# Patient Record
Sex: MALE | Race: WHITE | NOT HISPANIC OR LATINO | Employment: OTHER | URBAN - METROPOLITAN AREA
[De-identification: names, ages, dates, MRNs, and addresses within clinical notes are randomized per-mention and may not be internally consistent; named-entity substitution may affect disease eponyms.]

---

## 2022-06-11 ENCOUNTER — APPOINTMENT (OUTPATIENT)
Dept: GENERAL RADIOLOGY | Facility: HOSPITAL | Age: 57
End: 2022-06-11

## 2022-06-11 ENCOUNTER — APPOINTMENT (OUTPATIENT)
Dept: CT IMAGING | Facility: HOSPITAL | Age: 57
End: 2022-06-11

## 2022-06-11 ENCOUNTER — HOSPITAL ENCOUNTER (EMERGENCY)
Facility: HOSPITAL | Age: 57
Discharge: HOME | End: 2022-06-12
Attending: EMERGENCY MEDICINE | Admitting: EMERGENCY MEDICINE

## 2022-06-11 DIAGNOSIS — R07.81 RIB PAIN ON LEFT SIDE: Primary | ICD-10-CM
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
## 2022-06-11 DIAGNOSIS — S22.079A CLOSED FRACTURE OF TENTH THORACIC VERTEBRA, UNSPECIFIED FRACTURE MORPHOLOGY, INITIAL ENCOUNTER: ICD-10-CM

## 2022-06-11 DIAGNOSIS — S22.079A CLOSED FRACTURE OF NINTH THORACIC VERTEBRA, UNSPECIFIED FRACTURE MORPHOLOGY, INITIAL ENCOUNTER: ICD-10-CM

## 2022-06-11 DIAGNOSIS — S22.32XA CLOSED FRACTURE OF ONE RIB OF LEFT SIDE, INITIAL ENCOUNTER: ICD-10-CM

## 2022-06-11 LAB
ALBUMIN SERPL-MCNC: 3.8 G/DL (ref 3.5–5.2)
ALBUMIN/GLOB SERPL: 1.4 G/DL
ALP SERPL-CCNC: 110 U/L (ref 39–117)
ALT SERPL W P-5'-P-CCNC: 11 U/L (ref 1–41)
ANION GAP SERPL CALCULATED.3IONS-SCNC: 11 MMOL/L (ref 5–15)
AST SERPL-CCNC: 15 U/L (ref 1–40)
BASOPHILS # BLD AUTO: 0.1 10*3/MM3 (ref 0–0.2)
BASOPHILS NFR BLD AUTO: 0.8 % (ref 0–1.5)
BILIRUB SERPL-MCNC: 0.2 MG/DL (ref 0–1.2)
BUN SERPL-MCNC: 12 MG/DL (ref 6–20)
BUN/CREAT SERPL: 12.6 (ref 7–25)
CALCIUM SPEC-SCNC: 9.3 MG/DL (ref 8.6–10.5)
CHLORIDE SERPL-SCNC: 98 MMOL/L (ref 98–107)
CO2 SERPL-SCNC: 32 MMOL/L (ref 22–29)
CREAT SERPL-MCNC: 0.95 MG/DL (ref 0.76–1.27)
DEPRECATED RDW RBC AUTO: 42.4 FL (ref 37–54)
EGFRCR SERPLBLD CKD-EPI 2021: 92.8 ML/MIN/1.73
EOSINOPHIL # BLD AUTO: 0.3 10*3/MM3 (ref 0–0.4)
EOSINOPHIL NFR BLD AUTO: 3.4 % (ref 0.3–6.2)
ERYTHROCYTE [DISTWIDTH] IN BLOOD BY AUTOMATED COUNT: 13.9 % (ref 12.3–15.4)
GLOBULIN UR ELPH-MCNC: 2.7 GM/DL
GLUCOSE SERPL-MCNC: 114 MG/DL (ref 65–99)
HCT VFR BLD AUTO: 42.4 % (ref 37.5–51)
HGB BLD-MCNC: 14 G/DL (ref 13–17.7)
LYMPHOCYTES # BLD AUTO: 1.7 10*3/MM3 (ref 0.7–3.1)
LYMPHOCYTES NFR BLD AUTO: 21.1 % (ref 19.6–45.3)
MCH RBC QN AUTO: 28.4 PG (ref 26.6–33)
MCHC RBC AUTO-ENTMCNC: 33.1 G/DL (ref 31.5–35.7)
MCV RBC AUTO: 85.9 FL (ref 79–97)
MONOCYTES # BLD AUTO: 0.7 10*3/MM3 (ref 0.1–0.9)
MONOCYTES NFR BLD AUTO: 9 % (ref 5–12)
NEUTROPHILS NFR BLD AUTO: 5.4 10*3/MM3 (ref 1.7–7)
NEUTROPHILS NFR BLD AUTO: 65.7 % (ref 42.7–76)
NRBC BLD AUTO-RTO: 0 /100 WBC (ref 0–0.2)
NT-PROBNP SERPL-MCNC: 76 PG/ML (ref 0–900)
PLATELET # BLD AUTO: 331 10*3/MM3 (ref 140–450)
PMV BLD AUTO: 6.6 FL (ref 6–12)
POTASSIUM SERPL-SCNC: 3.9 MMOL/L (ref 3.5–5.2)
PROCALCITONIN SERPL-MCNC: 0.08 NG/ML (ref 0–0.25)
PROT SERPL-MCNC: 6.5 G/DL (ref 6–8.5)
RBC # BLD AUTO: 4.94 10*6/MM3 (ref 4.14–5.8)
SODIUM SERPL-SCNC: 141 MMOL/L (ref 136–145)
TROPONIN T SERPL-MCNC: <0.01 NG/ML (ref 0–0.03)
WBC NRBC COR # BLD: 8.2 10*3/MM3 (ref 3.4–10.8)

## 2022-06-11 PROCEDURE — 84484 ASSAY OF TROPONIN QUANT: CPT | Performed by: PHYSICIAN ASSISTANT

## 2022-06-11 PROCEDURE — 99283 EMERGENCY DEPT VISIT LOW MDM: CPT

## 2022-06-11 PROCEDURE — 99284 EMERGENCY DEPT VISIT MOD MDM: CPT

## 2022-06-11 PROCEDURE — 71045 X-RAY EXAM CHEST 1 VIEW: CPT

## 2022-06-11 PROCEDURE — 84145 PROCALCITONIN (PCT): CPT | Performed by: PHYSICIAN ASSISTANT

## 2022-06-11 PROCEDURE — 80053 COMPREHEN METABOLIC PANEL: CPT | Performed by: PHYSICIAN ASSISTANT

## 2022-06-11 PROCEDURE — 0 IOPAMIDOL PER 1 ML: Performed by: EMERGENCY MEDICINE

## 2022-06-11 PROCEDURE — 74177 CT ABD & PELVIS W/CONTRAST: CPT

## 2022-06-11 PROCEDURE — 85025 COMPLETE CBC W/AUTO DIFF WBC: CPT | Performed by: PHYSICIAN ASSISTANT

## 2022-06-11 PROCEDURE — 83880 ASSAY OF NATRIURETIC PEPTIDE: CPT | Performed by: PHYSICIAN ASSISTANT

## 2022-06-11 PROCEDURE — 93005 ELECTROCARDIOGRAM TRACING: CPT | Performed by: PHYSICIAN ASSISTANT

## 2022-06-11 PROCEDURE — 71260 CT THORAX DX C+: CPT

## 2022-06-11 RX ORDER — SODIUM CHLORIDE 0.9 % (FLUSH) 0.9 %
10 SYRINGE (ML) INJECTION AS NEEDED
Status: DISCONTINUED | OUTPATIENT
Start: 2022-06-11 | End: 2022-06-12 | Stop reason: HOSPADM

## 2022-06-11 RX ORDER — LIDOCAINE 50 MG/G
2 PATCH TOPICAL
Status: DISCONTINUED | OUTPATIENT
Start: 2022-06-11 | End: 2022-06-12 | Stop reason: HOSPADM

## 2022-06-11 RX ADMIN — LIDOCAINE 2 PATCH: 50 PATCH TOPICAL at 21:29

## 2022-06-11 RX ADMIN — IOPAMIDOL 100 ML: 755 INJECTION, SOLUTION INTRAVENOUS at 22:17

## 2022-06-12 VITALS
HEIGHT: 71 IN | DIASTOLIC BLOOD PRESSURE: 95 MMHG | HEART RATE: 105 BPM | OXYGEN SATURATION: 95 % | TEMPERATURE: 97.9 F | WEIGHT: 141.54 LBS | RESPIRATION RATE: 15 BRPM | SYSTOLIC BLOOD PRESSURE: 161 MMHG | BODY MASS INDEX: 19.81 KG/M2

## 2022-06-12 PROCEDURE — 63710000001 PREDNISONE PER 5 MG: Performed by: PHYSICIAN ASSISTANT

## 2022-06-12 PROCEDURE — 94640 AIRWAY INHALATION TREATMENT: CPT

## 2022-06-12 RX ORDER — METHYLPREDNISOLONE 4 MG/1
TABLET ORAL
Qty: 21 EACH | Refills: 0 | Status: SHIPPED | OUTPATIENT
Start: 2022-06-12

## 2022-06-12 RX ORDER — HYDROCODONE BITARTRATE AND ACETAMINOPHEN 5; 325 MG/1; MG/1
1 TABLET ORAL ONCE
Status: COMPLETED | OUTPATIENT
Start: 2022-06-12 | End: 2022-06-12

## 2022-06-12 RX ORDER — PREDNISONE 50 MG/1
50 TABLET ORAL ONCE
Status: COMPLETED | OUTPATIENT
Start: 2022-06-12 | End: 2022-06-12

## 2022-06-12 RX ORDER — LIDOCAINE 50 MG/G
1 PATCH TOPICAL EVERY 24 HOURS
Qty: 30 EACH | Refills: 0 | Status: SHIPPED | OUTPATIENT
Start: 2022-06-12

## 2022-06-12 RX ORDER — HYDROCODONE BITARTRATE AND ACETAMINOPHEN 5; 325 MG/1; MG/1
1 TABLET ORAL EVERY 8 HOURS PRN
Qty: 9 TABLET | Refills: 0 | Status: SHIPPED | OUTPATIENT
Start: 2022-06-12

## 2022-06-12 RX ORDER — ALBUTEROL SULFATE 90 UG/1
2 AEROSOL, METERED RESPIRATORY (INHALATION) ONCE
Status: COMPLETED | OUTPATIENT
Start: 2022-06-12 | End: 2022-06-12

## 2022-06-12 RX ORDER — ALBUTEROL SULFATE 90 UG/1
2 AEROSOL, METERED RESPIRATORY (INHALATION) EVERY 6 HOURS PRN
Qty: 6.7 G | Refills: 0 | Status: SHIPPED | OUTPATIENT
Start: 2022-06-12

## 2022-06-12 RX ADMIN — ALBUTEROL SULFATE 2 PUFF: 108 INHALANT RESPIRATORY (INHALATION) at 00:49

## 2022-06-12 RX ADMIN — HYDROCODONE BITARTRATE AND ACETAMINOPHEN 1 TABLET: 5; 325 TABLET ORAL at 00:27

## 2022-06-12 RX ADMIN — PREDNISONE 50 MG: 50 TABLET ORAL at 00:27
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
## 2022-06-12 NOTE — DISCHARGE INSTRUCTIONS
Please take pain medicine as prescribed as needed.  Please use lidocaine patches as needed for local relief.  If these are too expensive via pharmacy can use over-the-counter lidocaine patches instead.  Please use albuterol as needed for shortness of breath or wheezing.  Please take Medrol Dosepak to completion.  Please follow-up with your primary care provider in 1 week's time for symptom follow-up.  Please come back to the ER if you are acting confused, having worsening shortness of breath or spike high fevers you will need reevaluation that time.

## 2022-06-13 LAB — QT INTERVAL: 356 MS

## 2022-06-22 ENCOUNTER — HOSPITAL ENCOUNTER (OUTPATIENT)
Dept: GENERAL RADIOLOGY | Facility: HOSPITAL | Age: 57
Discharge: HOME | End: 2022-06-22

## 2022-06-22 ENCOUNTER — TRANSCRIBE ORDERS (OUTPATIENT)
Dept: ADMINISTRATIVE | Facility: HOSPITAL | Age: 57
End: 2022-06-22

## 2022-06-22 DIAGNOSIS — R07.9 CHEST PAIN, UNSPECIFIED TYPE: ICD-10-CM

## 2022-06-22 DIAGNOSIS — R07.9 CHEST PAIN, UNSPECIFIED TYPE: Primary | ICD-10-CM

## 2022-06-22 PROCEDURE — 71046 X-RAY EXAM CHEST 2 VIEWS: CPT

## 2022-06-22 PROCEDURE — 71100 X-RAY EXAM RIBS UNI 2 VIEWS: CPT

## 2024-01-09 ENCOUNTER — TRANSCRIBE ORDERS (OUTPATIENT)
Dept: ADMINISTRATIVE | Facility: HOSPITAL | Age: 59
End: 2024-01-09
Payer: COMMERCIAL

## 2024-01-09 DIAGNOSIS — R51.9 NONINTRACTABLE HEADACHE, UNSPECIFIED CHRONICITY PATTERN, UNSPECIFIED HEADACHE TYPE: Primary | ICD-10-CM

## 2024-02-06 ENCOUNTER — HOSPITAL ENCOUNTER (OUTPATIENT)
Dept: CT IMAGING | Facility: HOSPITAL | Age: 59
Discharge: HOME | End: 2024-02-06
Admitting: FAMILY MEDICINE
Payer: COMMERCIAL

## 2024-02-06 DIAGNOSIS — R51.9 NONINTRACTABLE HEADACHE, UNSPECIFIED CHRONICITY PATTERN, UNSPECIFIED HEADACHE TYPE: ICD-10-CM

## 2024-02-06 LAB
CREAT BLDA-MCNC: 1.2 MG/DL (ref 0.6–1.3)
EGFRCR SERPLBLD CKD-EPI 2021: 69.2 ML/MIN/1.73

## 2024-02-06 PROCEDURE — 82565 ASSAY OF CREATININE: CPT

## 2024-02-06 PROCEDURE — 25510000001 IOPAMIDOL PER 1 ML: Performed by: FAMILY MEDICINE

## 2024-02-06 PROCEDURE — 70470 CT HEAD/BRAIN W/O & W/DYE: CPT

## 2024-02-06 RX ADMIN — IOPAMIDOL 50 ML: 755 INJECTION, SOLUTION INTRAVENOUS at 12:24

## 2024-05-13 NOTE — PROGRESS NOTES
Primary Care Provider  Edward Ritter MD   Referring Provider  Caleb Ritter MD      Patient Complaint  Establish Care (New Patient ), COPD, and Shortness of Breath      Subjective          Alberto Schultz presents to [facility] PULMONARY & CRITICAL CARE MEDICINE      History of Presenting Illness  Alberto Schultz is a 58 y.o. male with chronic hypoxic respiratory failure, likely severe COPD, severe emphysema, chronic dyspnea, allergies, and tobacco use ongoing, here to establish care.    Mr. Schultz presents as a new patient in our clinic, referred by his primary care provider for management of COPD, chronic dyspnea.  Patient reports that he did go to a lung specialist years ago, at that time pulmonary function testing was done and he was told by 2 different pulmonologist that he only had a few years to live.  He stopped seeing them after this diagnosis, unsure which providers he went to so unable to get PFT results.  Since his diagnosis, patient's breathing has steadily worsened and he is now to the point where he gets short of breath with most any activity.  He would like to be able to improve his quality of life if possible, wants to be able to do normal activities such as mowing his grass without getting extremely winded.  Patient denies using any antibiotics or steroids for his lungs recently, denies any fevers or chills, no recent hospitalizations or ER visits for his breathing.  He continues to use Trelegy 100 daily as well as his albuterol inhaler as needed.  Patient reports that these medications do help his breathing but he still has severe exertional dyspnea.  He has had oxygen at home for about a year he believes, unsure of his DME company or how many liters he is on.  Patient is a current smoker, half a pack per day, which is much less than his previous 3 packs a day.  He is not interested in medications to help him quit, will continue trying to cut back on his own.  Patient denies any  productive cough, hemoptysis, swollen lymph nodes, weight loss, or night sweats.  He does not have a family history of lung cancer but his father had COPD and emphysema.  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  He does not have any pets in the home.  Patient is able to perform ADLs with modifications due to dyspnea.  I have personally reviewed the review of systems, past family, social, medical and surgical histories; and agree with their findings.      Review of Systems    Review of Systems   Constitutional:  Negative for activity change, chills, fatigue, fever, unexpected weight gain and unexpected weight loss.   HENT:  Negative for congestion, ear discharge, ear pain, mouth sores, postnasal drip, rhinorrhea, sinus pressure, sore throat, swollen glands and trouble swallowing.    Eyes:  Negative for blurred vision, pain, discharge, itching and visual disturbance.   Respiratory:  Positive for shortness of breath (with activity). Negative for apnea, cough, chest tightness, wheezing and stridor.    Cardiovascular:  Negative for chest pain, palpitations and leg swelling.   Gastrointestinal:  Negative for abdominal distention, abdominal pain, constipation, diarrhea, nausea, vomiting, GERD and indigestion.   Musculoskeletal:  Negative for arthralgias, joint swelling and myalgias.   Skin:  Negative for color change.   Neurological:  Negative for dizziness, weakness, light-headedness and headache.      Sleep: Negative for Excessive daytime sleepiness  Negative for morning headaches  Negative for Snoring      History reviewed. No pertinent family history.     Social History     Socioeconomic History    Marital status: Single   Tobacco Use    Smoking status: Every Day     Current packs/day: 0.50     Types: Cigarettes    Smokeless tobacco: Never   Vaping Use    Vaping status: Never Used   Substance and Sexual Activity    Alcohol use: Never    Drug use: Never     Sexual activity: Defer        History reviewed. No pertinent past medical history.     Immunization History   Administered Date(s) Administered    31-influenza Vac Quardvalent Preservativ 10/21/2014    Pneumococcal Conjugate 20-Valent (PCV20) 05/14/2024       No Known Allergies       Current Outpatient Medications:     albuterol sulfate  (90 Base) MCG/ACT inhaler, Inhale 2 puffs Every 6 (Six) Hours As Needed for Wheezing or Shortness of Air., Disp: 6.7 g, Rfl: 0    busPIRone (BUSPAR) 5 MG tablet, Take 1 tablet by mouth 3 (Three) Times a Day As Needed. for anxiety, Disp: , Rfl:     neomycin-polymyxin-hydrocortisone (CORTISPORIN) 1 % solution otic solution, INSTILL 2 DROPS INTO AFFECTED EAR(S) FOUR TIMES DAILY, Disp: , Rfl:     cetirizine (zyrTEC) 10 MG tablet, Take 1 tablet by mouth Daily., Disp: 30 tablet, Rfl: 5    Fluticasone-Umeclidin-Vilant (TRELEGY ELLIPTA) 200-62.5-25 MCG/ACT inhaler, Inhale 1 puff Daily., Disp: 1 each, Rfl: 5    Fluticasone-Umeclidin-Vilant (Trelegy Ellipta) 200-62.5-25 MCG/ACT inhaler, Inhale 1 puff Daily for 1 day., Disp: 2 each, Rfl: 0    HYDROcodone-acetaminophen (NORCO) 5-325 MG per tablet, Take 1 tablet by mouth Every 8 (Eight) Hours As Needed for Severe Pain . (Patient not taking: Reported on 5/14/2024), Disp: 9 tablet, Rfl: 0    ipratropium-albuterol (DUO-NEB) 0.5-2.5 mg/3 ml nebulizer, Take 3 mL by nebulization 4 (Four) Times a Day., Disp: 360 mL, Rfl: 3    lidocaine (LIDODERM) 5 %, Place 1 patch on the skin as directed by provider Daily. Remove & Discard patch within 12 hours or as directed by MD (Patient not taking: Reported on 5/14/2024), Disp: 30 each, Rfl: 0    predniSONE (DELTASONE) 10 MG tablet, Take 4 tabs daily x 3 days, then take 3 tabs daily x 3 days, then take 2 tabs daily x 3 days, then take 1 tab daily x 3 days, Disp: 31 tablet, Rfl: 0     Objective     Vital Signs:   /81 (BP Location: Left arm, Patient Position: Sitting, Cuff Size: Adult)   Pulse 99    "Temp 98 °F (36.7 °C) (Oral)   Resp 18   Ht 180.3 cm (71\")   Wt 63.8 kg (140 lb 11.2 oz)   SpO2 90% Comment: ROOM AIR  BMI 19.62 kg/m²     Physical Exam  Constitutional:       General: He is not in acute distress.     Appearance: Normal appearance. He is normal weight. He is not ill-appearing.   HENT:      Right Ear: Tympanic membrane and ear canal normal.      Left Ear: Tympanic membrane and ear canal normal.      Nose: Nose normal.      Mouth/Throat:      Mouth: Mucous membranes are moist.      Pharynx: Oropharynx is clear.   Eyes:      Extraocular Movements: Extraocular movements intact.      Conjunctiva/sclera: Conjunctivae normal.      Pupils: Pupils are equal, round, and reactive to light.   Cardiovascular:      Rate and Rhythm: Normal rate and regular rhythm.      Pulses: Normal pulses.      Heart sounds: Normal heart sounds.   Pulmonary:      Effort: Pulmonary effort is normal. No respiratory distress.      Breath sounds: Decreased air movement present. No stridor. No wheezing, rhonchi or rales.      Comments: Extremely diminished, tight throughout  Abdominal:      General: Bowel sounds are normal.      Palpations: Abdomen is soft.   Musculoskeletal:         General: No swelling. Normal range of motion.      Cervical back: Normal range of motion and neck supple.      Right lower leg: No edema.      Left lower leg: No edema.   Skin:     General: Skin is warm and dry.   Neurological:      General: No focal deficit present.      Mental Status: He is alert and oriented to person, place, and time.      Motor: No weakness.   Psychiatric:         Mood and Affect: Mood normal.         Behavior: Behavior normal.        Result Review :   I have personally reviewed patient's labs and images.  I also reviewed Dr. Ritter PCP's last progress note 4/11/2024.            Diagnoses and all orders for this visit:    1. Chronic respiratory failure with hypoxia (Primary)  -     Oxygen Therapy    2. Chronic obstructive " pulmonary disease, unspecified COPD type  -     6 Minute Walk Test; Future  -     ipratropium-albuterol (DUO-NEB) 0.5-2.5 mg/3 ml nebulizer; Take 3 mL by nebulization 4 (Four) Times a Day.  Dispense: 360 mL; Refill: 3  -     Home Nebulizer  -     Fluticasone-Umeclidin-Vilant (TRELEGY ELLIPTA) 200-62.5-25 MCG/ACT inhaler; Inhale 1 puff Daily.  Dispense: 1 each; Refill: 5  -     predniSONE (DELTASONE) 10 MG tablet; Take 4 tabs daily x 3 days, then take 3 tabs daily x 3 days, then take 2 tabs daily x 3 days, then take 1 tab daily x 3 days  Dispense: 31 tablet; Refill: 0    3. Pulmonary emphysema, unspecified emphysema type  -     6 Minute Walk Test; Future  -     ipratropium-albuterol (DUO-NEB) 0.5-2.5 mg/3 ml nebulizer; Take 3 mL by nebulization 4 (Four) Times a Day.  Dispense: 360 mL; Refill: 3  -     Home Nebulizer  -     Fluticasone-Umeclidin-Vilant (TRELEGY ELLIPTA) 200-62.5-25 MCG/ACT inhaler; Inhale 1 puff Daily.  Dispense: 1 each; Refill: 5  -     predniSONE (DELTASONE) 10 MG tablet; Take 4 tabs daily x 3 days, then take 3 tabs daily x 3 days, then take 2 tabs daily x 3 days, then take 1 tab daily x 3 days  Dispense: 31 tablet; Refill: 0    4. Dyspnea, unspecified type  -     6 Minute Walk Test; Future  -     ipratropium-albuterol (DUO-NEB) 0.5-2.5 mg/3 ml nebulizer; Take 3 mL by nebulization 4 (Four) Times a Day.  Dispense: 360 mL; Refill: 3  -     Home Nebulizer  -     Fluticasone-Umeclidin-Vilant (TRELEGY ELLIPTA) 200-62.5-25 MCG/ACT inhaler; Inhale 1 puff Daily.  Dispense: 1 each; Refill: 5  -     predniSONE (DELTASONE) 10 MG tablet; Take 4 tabs daily x 3 days, then take 3 tabs daily x 3 days, then take 2 tabs daily x 3 days, then take 1 tab daily x 3 days  Dispense: 31 tablet; Refill: 0    5. Seasonal allergies  -     cetirizine (zyrTEC) 10 MG tablet; Take 1 tablet by mouth Daily.  Dispense: 30 tablet; Refill: 5    6. Tobacco abuse    7. Encounter for smoking cessation counseling    8. Encounter for  "immunization  -     Pneumococcal Conjugate Vaccine 20-Valent (PCV20)       Impression and Plan    -CT chest 6/11/2022 after a fall showed rib fractures, severe emphysema, subtle pleural plaque in the right upper lobe, \"bronchial wall thickening due to bronchiolitis or smoking-related lung disease\"  -Pulmonary function testing done in the past per patient, unsure when or where.  Will plan to order updated PFTs next visit.  -Patient needs to be enrolled in annual low-dose lung cancer screening [place] program, will order next visit  -6-minute walk test done in clinic today 5/14/2024, patient required 4 L continuous supplemental oxygen to maintain O2 saturation.  New oxygen order placed.  -Increase Trelegy dose from 100 to 200 daily, reminded patient to rinse mouth after each use.  Samples of higher dose Trelegy provided in clinic today, prescription sent to patient's pharmacy.  -Continue using albuterol inhaler as needed  -Begin using DuoNeb treatments every 6 hours as needed, new nebulizer machine provided in clinic today.  Encouraged patient to use at least morning and night.  -Prednisone prescribed today for patient to have on hand in case of COPD exacerbation  -Begin taking Zyrtec daily for seasonal allergies  -Smoking cessation counseling provided.  I spent 5 minutes today counseling patient on the risks of smoking, including throat cancer, lung cancer, COPD, heart disease and death.  Also discussed the benefits of quitting.  -Follow-up with myself in 6 weeks    Smoking status: Reviewed  Vaccination status: Patient reports he is up-to-date declines COVID-vaccine, will be due for flu vaccine in the fall, PCV 20 administered in clinic today.  He is interested in the RSV vaccine, instructed him to reach out to insurance to see where they prefer he get it.  Patient is advised to continue to follow CDC recommendations such as social distancing wearing a mask and washing hands for at least 20 seconds.  Medications " personally reviewed    Follow Up   No follow-ups on file.  Patient was given instructions and counseling regarding his condition or for health maintenance advice. Please see specific information pulled into the AVS if appropriate.

## 2024-05-14 ENCOUNTER — OFFICE VISIT (OUTPATIENT)
Dept: PULMONOLOGY | Facility: CLINIC | Age: 59
End: 2024-05-14
Payer: COMMERCIAL

## 2024-05-14 VITALS
WEIGHT: 140.7 LBS | OXYGEN SATURATION: 90 % | HEIGHT: 71 IN | RESPIRATION RATE: 18 BRPM | SYSTOLIC BLOOD PRESSURE: 131 MMHG | DIASTOLIC BLOOD PRESSURE: 81 MMHG | TEMPERATURE: 98 F | BODY MASS INDEX: 19.7 KG/M2 | HEART RATE: 99 BPM

## 2024-05-14 DIAGNOSIS — Z72.0 TOBACCO ABUSE: ICD-10-CM

## 2024-05-14 DIAGNOSIS — J43.9 PULMONARY EMPHYSEMA, UNSPECIFIED EMPHYSEMA TYPE: ICD-10-CM

## 2024-05-14 DIAGNOSIS — J44.9 CHRONIC OBSTRUCTIVE PULMONARY DISEASE, UNSPECIFIED COPD TYPE: ICD-10-CM

## 2024-05-14 DIAGNOSIS — R06.00 DYSPNEA, UNSPECIFIED TYPE: ICD-10-CM

## 2024-05-14 DIAGNOSIS — Z23 ENCOUNTER FOR IMMUNIZATION: ICD-10-CM

## 2024-05-14 DIAGNOSIS — J96.11 CHRONIC RESPIRATORY FAILURE WITH HYPOXIA: Primary | ICD-10-CM

## 2024-05-14 DIAGNOSIS — J30.2 SEASONAL ALLERGIES: ICD-10-CM

## 2024-05-14 DIAGNOSIS — Z71.6 ENCOUNTER FOR SMOKING CESSATION COUNSELING: ICD-10-CM

## 2024-05-14 PROCEDURE — 1160F RVW MEDS BY RX/DR IN RCRD: CPT

## 2024-05-14 PROCEDURE — 90677 PCV20 VACCINE IM: CPT

## 2024-05-14 PROCEDURE — 94618 PULMONARY STRESS TESTING: CPT

## 2024-05-14 PROCEDURE — 90471 IMMUNIZATION ADMIN: CPT

## 2024-05-14 PROCEDURE — 1159F MED LIST DOCD IN RCRD: CPT

## 2024-05-14 PROCEDURE — 99244 OFF/OP CNSLTJ NEW/EST MOD 40: CPT

## 2024-05-14 RX ORDER — NEOMYCIN SULFATE, POLYMYXIN B SULFATE, HYDROCORTISONE 3.5; 10000; 1 MG/ML; [USP'U]/ML; MG/ML
SOLUTION/ DROPS AURICULAR (OTIC)
COMMUNITY
Start: 2024-04-11

## 2024-05-14 RX ORDER — BUSPIRONE HYDROCHLORIDE 5 MG/1
5 TABLET ORAL 3 TIMES DAILY PRN
COMMUNITY
Start: 2024-04-11

## 2024-05-14 RX ORDER — FLUTICASONE FUROATE, UMECLIDINIUM BROMIDE AND VILANTEROL TRIFENATATE 200; 62.5; 25 UG/1; UG/1; UG/1
1 POWDER RESPIRATORY (INHALATION) DAILY
Qty: 2 EACH | Refills: 0 | COMMUNITY
Start: 2024-05-14 | End: 2024-05-15

## 2024-05-14 RX ORDER — IPRATROPIUM BROMIDE AND ALBUTEROL SULFATE 2.5; .5 MG/3ML; MG/3ML
3 SOLUTION RESPIRATORY (INHALATION)
Qty: 360 ML | Refills: 3 | Status: SHIPPED | OUTPATIENT
Start: 2024-05-14

## 2024-05-14 RX ORDER — CETIRIZINE HYDROCHLORIDE 10 MG/1
10 TABLET ORAL DAILY
Qty: 30 TABLET | Refills: 5 | Status: SHIPPED | OUTPATIENT
Start: 2024-05-14

## 2024-05-14 RX ORDER — PREDNISONE 10 MG/1
TABLET ORAL
Qty: 31 TABLET | Refills: 0 | Status: SHIPPED | OUTPATIENT
Start: 2024-05-14

## 2024-05-14 RX ORDER — AZITHROMYCIN 250 MG/1
TABLET, FILM COATED ORAL
COMMUNITY
Start: 2024-04-11 | End: 2024-05-14

## 2024-05-15 ENCOUNTER — TELEPHONE (OUTPATIENT)
Dept: PULMONOLOGY | Facility: CLINIC | Age: 59
End: 2024-05-15
Payer: COMMERCIAL

## 2024-05-15 NOTE — TELEPHONE ENCOUNTER
[facility]. Per insurance no PA is required for Stiolto. Please Advise, Thank you

## 2024-05-16 RX ORDER — TIOTROPIUM BROMIDE AND OLODATEROL 3.124; 2.736 UG/1; UG/1
2 SPRAY, METERED RESPIRATORY (INHALATION)
Qty: 1 EACH | Refills: 3 | Status: SHIPPED | OUTPATIENT
Start: 2024-05-16 | End: 2024-05-17

## 2024-05-16 NOTE — TELEPHONE ENCOUNTER
Called and spoke with Anna on verbal release patients mother. Informed her Trelegy was not covered so Stiolto was sent in instead. Informed her patient can finish the samples of trelegy we gave him and then start the Stiolto. Instructed her that patient should not to take both as it will be duplicate therapy. Anna understood and stated she would inform the patient.

## 2024-06-22 NOTE — PROGRESS NOTES
Primary Care Provider  Edward Ritter MD   Referring Provider  No ref. provider found      Patient Complaint  Chronic respiratory failure with hypoxia and Follow-up (6 Week )      Subjective          Alberto Schultz presents to [facility] PULMONARY & CRITICAL CARE MEDICINE      History of Presenting Illness  Alberto Schultz is a 58 y.o. male with chronic hypoxic respiratory failure, likely severe COPD, severe emphysema, chronic dyspnea, allergies, and tobacco use ongoing, here for 6 week follow up.    Patient states he is willing better since his last visit, previously seen by myself as new patient 6 weeks ago, at which time we adjusted his inhaler/nebulizer medications and did an updated walk test.  Patient reports that he did go to a lung specialist years ago, at that time pulmonary function testing was done and he was told by 2 different pulmonologists that he only had a few years to live.  He stopped seeing them after this diagnosis, unsure which providers he went to so unable to get PFT results.  Patient's chronic dyspnea has steadily worsened and he is now to the point where he gets short of breath with most any activity.  He would like to be able to improve his quality of life if possible, wants to be able to do normal activities such as mowing his grass without getting extremely winded.  Patient denies using any antibiotics or steroids for his lungs recently, denies any fevers or chills, no hospitalizations or ER visits for his breathing since he was last seen.  He has been using Trelegy 200 daily as well as his albuterol inhaler and DuoNeb treatments as needed since his last visit.  Patient reports that these medications have helped his respiratory symptoms.  He continues to wear 4 L supplemental oxygen.  Patient is a current smoker, half a pack per day, which is much less than his previous 3 packs a day.  He is not interested in medications to help him quit, will continue trying to cut back on  his own.  Patient denies any hemoptysis, swollen lymph nodes, weight loss, or night sweats.  He does not have a family history of lung cancer but his father had COPD and emphysema.  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  He does not have any pets in the home.  Patient is able to perform ADLs with modifications due to dyspnea.  I have personally reviewed the review of systems, past family, social, medical and surgical histories; and agree with their findings.      Review of Systems    Review of Systems   Constitutional:  Negative for activity change, chills, fatigue, fever, unexpected weight gain and unexpected weight loss.   HENT:  Negative for congestion, ear discharge, ear pain, mouth sores, postnasal drip, rhinorrhea, sinus pressure, sore throat, swollen glands and trouble swallowing.    Eyes:  Negative for blurred vision, pain, discharge, itching and visual disturbance.   Respiratory:  Positive for shortness of breath (with activity). Negative for apnea, cough, chest tightness, wheezing and stridor.    Cardiovascular:  Negative for chest pain, palpitations and leg swelling.   Gastrointestinal:  Negative for abdominal distention, abdominal pain, constipation, diarrhea, nausea, vomiting, GERD and indigestion.   Musculoskeletal:  Negative for arthralgias, joint swelling and myalgias.   Skin:  Negative for color change.   Neurological:  Negative for dizziness, weakness, light-headedness and headache.      Sleep: Negative for Excessive daytime sleepiness  Negative for morning headaches  Negative for Snoring      History reviewed. No pertinent family history.     Social History     Socioeconomic History    Marital status: Single   Tobacco Use    Smoking status: Every Day     Current packs/day: 1.50     Average packs/day: 2.8 packs/day for 48.5 years (137.2 ttl pk-yrs)     Types: Cigarettes     Start date: 1976     Passive exposure: Current    Smokeless  "tobacco: Never   Vaping Use    Vaping status: Never Used   Substance and Sexual Activity    Alcohol use: Never    Drug use: Never    Sexual activity: Defer        History reviewed. No pertinent past medical history.     Immunization History   Administered Date(s) Administered    31-influenza Vac Quardvalent Preservativ 10/21/2014    Pneumococcal Conjugate 20-Valent (PCV20) 05/14/2024       No Known Allergies       Current Outpatient Medications:     albuterol sulfate  (90 Base) MCG/ACT inhaler, Inhale 2 puffs Every 6 (Six) Hours As Needed for Wheezing or Shortness of Air., Disp: 6.7 g, Rfl: 0    busPIRone (BUSPAR) 5 MG tablet, Take 1 tablet by mouth 3 (Three) Times a Day As Needed. for anxiety, Disp: , Rfl:     cetirizine (zyrTEC) 10 MG tablet, Take 1 tablet by mouth Daily., Disp: 30 tablet, Rfl: 5    HYDROcodone-acetaminophen (NORCO) 5-325 MG per tablet, Take 1 tablet by mouth Every 8 (Eight) Hours As Needed for Severe Pain ., Disp: 9 tablet, Rfl: 0    ipratropium-albuterol (DUO-NEB) 0.5-2.5 mg/3 ml nebulizer, Take 3 mL by nebulization 4 (Four) Times a Day., Disp: 360 mL, Rfl: 3    lidocaine (LIDODERM) 5 %, Place 1 patch on the skin as directed by provider Daily. Remove & Discard patch within 12 hours or as directed by MD, Disp: 30 each, Rfl: 0    neomycin-polymyxin-hydrocortisone (CORTISPORIN) 1 % solution otic solution, INSTILL 2 DROPS INTO AFFECTED EAR(S) FOUR TIMES DAILY, Disp: , Rfl:     predniSONE (DELTASONE) 10 MG tablet, Take 4 tabs daily x 3 days, then take 3 tabs daily x 3 days, then take 2 tabs daily x 3 days, then take 1 tab daily x 3 days, Disp: 31 tablet, Rfl: 0     Objective     Vital Signs:   /86 (BP Location: Left arm, Patient Position: Sitting, Cuff Size: Adult)   Pulse 100   Temp 97.8 °F (36.6 °C) (Temporal)   Resp 16   Ht 170.2 cm (67\")   Wt 64.4 kg (142 lb)   SpO2 90% Comment: Room air. Uses 4L as needed  BMI 22.24 kg/m²     Physical Exam  Constitutional:       General: He " is not in acute distress.     Appearance: Normal appearance. He is normal weight. He is not ill-appearing.   HENT:      Right Ear: Tympanic membrane and ear canal normal.      Left Ear: Tympanic membrane and ear canal normal.      Nose: Nose normal.      Mouth/Throat:      Mouth: Mucous membranes are moist.      Pharynx: Oropharynx is clear.   Eyes:      Extraocular Movements: Extraocular movements intact.      Conjunctiva/sclera: Conjunctivae normal.      Pupils: Pupils are equal, round, and reactive to light.   Cardiovascular:      Rate and Rhythm: Normal rate and regular rhythm.      Pulses: Normal pulses.      Heart sounds: Normal heart sounds.   Pulmonary:      Effort: Pulmonary effort is normal. No respiratory distress.      Breath sounds: Decreased air movement present. No stridor. No wheezing, rhonchi or rales.      Comments: Extremely diminished, tight throughout  Abdominal:      General: Bowel sounds are normal.      Palpations: Abdomen is soft.   Musculoskeletal:         General: No swelling. Normal range of motion.      Cervical back: Normal range of motion and neck supple.      Right lower leg: No edema.      Left lower leg: No edema.   Skin:     General: Skin is warm and dry.   Neurological:      General: No focal deficit present.      Mental Status: He is alert and oriented to person, place, and time.      Motor: No weakness.   Psychiatric:         Mood and Affect: Mood normal.         Behavior: Behavior normal.        Result Review :   I have personally reviewed patient's labs and images.  I also reviewed my last office note 5/14/2024.       Diagnoses and all orders for this visit:    1. Chronic respiratory failure with hypoxia (Primary)    2. Chronic obstructive pulmonary disease, unspecified COPD type  -     predniSONE (DELTASONE) 10 MG tablet; Take 4 tabs daily x 3 days, then take 3 tabs daily x 3 days, then take 2 tabs daily x 3 days, then take 1 tab daily x 3 days  Dispense: 31 tablet; Refill:  "0    3. Pulmonary emphysema, unspecified emphysema type  -     predniSONE (DELTASONE) 10 MG tablet; Take 4 tabs daily x 3 days, then take 3 tabs daily x 3 days, then take 2 tabs daily x 3 days, then take 1 tab daily x 3 days  Dispense: 31 tablet; Refill: 0    4. Dyspnea, unspecified type  -     predniSONE (DELTASONE) 10 MG tablet; Take 4 tabs daily x 3 days, then take 3 tabs daily x 3 days, then take 2 tabs daily x 3 days, then take 1 tab daily x 3 days  Dispense: 31 tablet; Refill: 0    5. Seasonal allergies    6. Tobacco abuse  -     CT Chest Low Dose Wo; Future    7. Encounter for smoking cessation counseling  -     CT Chest Low Dose Wo; Future    8. Personal history of nicotine dependence  -     CT Chest Low Dose Wo; Future      Impression and Plan    -CT chest 6/11/2022 after a fall showed rib fractures, severe emphysema, subtle pleural plaque in the right upper lobe, \"bronchial wall thickening due to bronchiolitis or smoking-related lung disease\"  -Patient states he recently had PFTs done with Dr. Ritter, will try to obtain records  -Patient qualifies for enrollment in annual lung cancer screening program, will order today  -Continue wearing 4 L continuous supplemental oxygen to maintain O2 saturation.  Patient does not wear all the time, I encouraged him to wear continuously.  -Continue using Trelegy 200 daily, reminded patient to rinse mouth after each use  -Continue using albuterol inhaler and DuoNeb treatments as needed, nebulizer machine provided in clinic last visit.  Encouraged patient to use at least morning and night.  -Patient has prednisone on hand in case of COPD exacerbation  -Continue taking Zyrtec daily for seasonal allergies  -Smoking cessation counseling provided.  I spent 5 minutes today counseling patient on the risks of smoking, including throat cancer, lung cancer, COPD, heart disease and death.  Also discussed the benefits of quitting.  -Follow-up with MD in 2 months after LDCT    Smoking " status: Reviewed  Vaccination status: Patient reports he is up-to-date with his pneumonia vaccine, will be due for flu vaccine in the fall.  He is interested in the RSV vaccine, instructed him to reach out to insurance to see where they prefer he get it.  Patient is advised to continue to follow CDC recommendations such as social distancing wearing a mask and washing hands for at least 20 seconds.  Medications personally reviewed    Follow Up   No follow-ups on file.  Patient was given instructions and counseling regarding his condition or for health maintenance advice. Please see specific information pulled into the AVS if appropriate.

## 2024-06-25 ENCOUNTER — OFFICE VISIT (OUTPATIENT)
Dept: PULMONOLOGY | Facility: CLINIC | Age: 59
End: 2024-06-25
Payer: COMMERCIAL

## 2024-06-25 VITALS
HEART RATE: 100 BPM | HEIGHT: 67 IN | TEMPERATURE: 97.8 F | OXYGEN SATURATION: 90 % | BODY MASS INDEX: 22.29 KG/M2 | WEIGHT: 142 LBS | RESPIRATION RATE: 16 BRPM | DIASTOLIC BLOOD PRESSURE: 86 MMHG | SYSTOLIC BLOOD PRESSURE: 139 MMHG

## 2024-06-25 DIAGNOSIS — Z71.6 ENCOUNTER FOR SMOKING CESSATION COUNSELING: ICD-10-CM

## 2024-06-25 DIAGNOSIS — J30.2 SEASONAL ALLERGIES: ICD-10-CM

## 2024-06-25 DIAGNOSIS — Z72.0 TOBACCO ABUSE: ICD-10-CM

## 2024-06-25 DIAGNOSIS — J44.9 CHRONIC OBSTRUCTIVE PULMONARY DISEASE, UNSPECIFIED COPD TYPE: ICD-10-CM

## 2024-06-25 DIAGNOSIS — J96.11 CHRONIC RESPIRATORY FAILURE WITH HYPOXIA: Primary | ICD-10-CM

## 2024-06-25 DIAGNOSIS — R06.00 DYSPNEA, UNSPECIFIED TYPE: ICD-10-CM

## 2024-06-25 DIAGNOSIS — J43.9 PULMONARY EMPHYSEMA, UNSPECIFIED EMPHYSEMA TYPE: ICD-10-CM

## 2024-06-25 DIAGNOSIS — Z87.891 PERSONAL HISTORY OF NICOTINE DEPENDENCE: ICD-10-CM

## 2024-06-25 PROCEDURE — 1160F RVW MEDS BY RX/DR IN RCRD: CPT

## 2024-06-25 PROCEDURE — 99214 OFFICE O/P EST MOD 30 MIN: CPT

## 2024-06-25 PROCEDURE — 1159F MED LIST DOCD IN RCRD: CPT

## 2024-06-25 RX ORDER — PREDNISONE 10 MG/1
TABLET ORAL
Qty: 31 TABLET | Refills: 0 | Status: SHIPPED | OUTPATIENT
Start: 2024-06-25

## 2025-06-18 ENCOUNTER — OFFICE VISIT (OUTPATIENT)
Dept: SURGERY | Facility: CLINIC | Age: 60
End: 2025-06-18
Payer: COMMERCIAL

## 2025-06-18 ENCOUNTER — PREP FOR SURGERY (OUTPATIENT)
Dept: OTHER | Facility: HOSPITAL | Age: 60
End: 2025-06-18
Payer: COMMERCIAL

## 2025-06-18 VITALS
BODY MASS INDEX: 22.29 KG/M2 | SYSTOLIC BLOOD PRESSURE: 129 MMHG | OXYGEN SATURATION: 94 % | HEART RATE: 88 BPM | DIASTOLIC BLOOD PRESSURE: 80 MMHG | WEIGHT: 142 LBS | HEIGHT: 67 IN

## 2025-06-18 DIAGNOSIS — K62.5 BRBPR (BRIGHT RED BLOOD PER RECTUM): ICD-10-CM

## 2025-06-18 DIAGNOSIS — R11.0 NAUSEA: ICD-10-CM

## 2025-06-18 DIAGNOSIS — K21.9 GASTROESOPHAGEAL REFLUX DISEASE WITHOUT ESOPHAGITIS: ICD-10-CM

## 2025-06-18 DIAGNOSIS — R19.7 DIARRHEA: ICD-10-CM

## 2025-06-18 DIAGNOSIS — R10.13 EPIGASTRIC PAIN: ICD-10-CM

## 2025-06-18 DIAGNOSIS — K21.00 GASTROESOPHAGEAL REFLUX DISEASE WITH ESOPHAGITIS WITHOUT HEMORRHAGE: Primary | ICD-10-CM

## 2025-06-18 DIAGNOSIS — K92.1 MELENA: ICD-10-CM

## 2025-06-18 DIAGNOSIS — Z72.0 TOBACCO USE: Primary | ICD-10-CM

## 2025-06-18 DIAGNOSIS — R19.7 DIARRHEA, UNSPECIFIED TYPE: ICD-10-CM

## 2025-06-18 RX ORDER — POLYETHYLENE GLYCOL 3350 17 G/17G
POWDER, FOR SOLUTION ORAL
Qty: 238 PACKET | Refills: 0 | Status: ON HOLD | OUTPATIENT
Start: 2025-06-18 | End: 2025-06-20

## 2025-06-18 RX ORDER — SODIUM CHLORIDE 9 MG/ML
40 INJECTION, SOLUTION INTRAVENOUS AS NEEDED
Status: CANCELLED | OUTPATIENT
Start: 2025-06-18

## 2025-06-18 RX ORDER — SODIUM CHLORIDE 0.9 % (FLUSH) 0.9 %
10 SYRINGE (ML) INJECTION AS NEEDED
Status: CANCELLED | OUTPATIENT
Start: 2025-06-18

## 2025-06-18 RX ORDER — SODIUM CHLORIDE 0.9 % (FLUSH) 0.9 %
3 SYRINGE (ML) INJECTION EVERY 12 HOURS SCHEDULED
Status: CANCELLED | OUTPATIENT
Start: 2025-06-18

## 2025-06-18 NOTE — PROGRESS NOTES
Chief Complaint: Colonoscopy    Subjective     EGD colonoscopy consultation       History of Present Illness  Alberto Schultz is a 59 y.o. male presents to [facility] for EGD colonoscopy consultation.    Patient's sister is present for this visit    Patient presents today on referral from Dr. Caleb Ritter for EGD colonoscopy consultation.  Patient reports that he does have some intermittent heartburn and indigestion, despite taking OTC meds.  Patient does report that he has nausea no vomiting.  Admits to epigastric pain.  Admits to melena x 1 year.  Admits to pain before eating.  Denies any family history of esophageal or stomach cancer.    Patient does report that he is having some lower abdominal pain associated with diarrhea.  He reports he rarely has a solid stool.  Admits to bright red blood per rectum while wiping.  He also reports it is significant enough he sees it in the toilet.  Denies any family history of colorectal cancer.  No previous colonoscopy.    Patient reports that he has had a decrease in appetite and a 15 pound weight loss over the last 6 months.    Patient reports he had a JUAN LUIS test that was inconclusive.    Denies any cardiac issues.    Denies taking any GLP-receptors.    Patient is on O2 at 4 L for COPD.  He is under the care of Dr. Bran        Objective     History reviewed. No pertinent past medical history.    Past Surgical History:   Procedure Laterality Date    OTHER SURGICAL HISTORY Left     SHOULDER       Outpatient Medications Marked as Taking for the 6/18/25 encounter (Office Visit) with Carleen Ramos APRN   Medication Sig Dispense Refill    albuterol sulfate  (90 Base) MCG/ACT inhaler Inhale 2 puffs Every 6 (Six) Hours As Needed for Wheezing or Shortness of Air. 6.7 g 0    ipratropium-albuterol (DUO-NEB) 0.5-2.5 mg/3 ml nebulizer Take 3 mL by nebulization 4 (Four) Times a Day. 360 mL 3       No Known Allergies     History reviewed. No pertinent  "family history.    Social History     Socioeconomic History    Marital status: Single   Tobacco Use    Smoking status: Every Day     Current packs/day: 1.50     Average packs/day: 2.8 packs/day for 49.5 years (138.7 ttl pk-yrs)     Types: Cigarettes     Start date: 1976     Passive exposure: Current    Smokeless tobacco: Never   Vaping Use    Vaping status: Never Used   Substance and Sexual Activity    Alcohol use: Never    Drug use: Never    Sexual activity: Defer       Review of Systems   Constitutional:  Negative for chills and fever.   HENT:  Negative for trouble swallowing.    Gastrointestinal:  Positive for abdominal pain, anal bleeding, blood in stool, diarrhea, nausea, GERD and indigestion. Negative for abdominal distention, constipation, rectal pain and vomiting.        Vital Signs:   /80 (BP Location: Right arm, Patient Position: Sitting, Cuff Size: Adult)   Pulse 88   Ht 170.2 cm (67\")   Wt 64.4 kg (142 lb)   SpO2 94% Comment: 4liters-o2 continuous  BMI 22.24 kg/m²      Physical Exam  Vitals and nursing note reviewed.   Constitutional:       General: He is not in acute distress.     Appearance: Normal appearance. He is not ill-appearing.   HENT:      Head: Normocephalic and atraumatic.   Cardiovascular:      Rate and Rhythm: Normal rate.   Pulmonary:      Effort: Pulmonary effort is normal.      Breath sounds: No stridor.      Comments: O2 @ 4 L  Abdominal:      Palpations: Abdomen is soft.      Tenderness: There is no guarding.   Musculoskeletal:         General: No deformity. Normal range of motion.   Skin:     General: Skin is warm and dry.      Coloration: Skin is not jaundiced.   Neurological:      General: No focal deficit present.      Mental Status: He is alert and oriented to person, place, and time.   Psychiatric:         Mood and Affect: Mood normal.         Thought Content: Thought content normal.          Result Review :          []  Laboratory  []  Radiology  []  Pathology  []  " Microbiology  []  EKG/Telemetry   []  Cardiology/Vascular   []  Old records  I spent 30 minutes caring for Alberto on this date of service. This time includes time spent by me in the following activities: reviewing tests, obtaining and/or reviewing a separately obtained history, performing a medically appropriate examination and/or evaluation, ordering medications, tests, or procedures, and documenting information in the medical record        Assessment and Plan    Diagnoses and all orders for this visit:    1. Tobacco use (Primary)    2. Gastroesophageal reflux disease without esophagitis    3. Nausea    4. Epigastric pain    5. Melena    6. Diarrhea, unspecified type    7. BRBPR (bright red blood per rectum)    Other orders  -     polyethylene glycol (MIRALAX) 17 g packet; Take as directed.  Instructions given in office.  Dispense: 238 g bottle  Dispense: 238 packet; Refill: 0    Pulmonary clearance from Bran.        Follow Up   Return for Schedule EGD and colonoscopy with Dr. Gamble on 6/20/2025 at [facility].    Hospital arrival time: 0600    Possible risks/complications, benefits, and alternatives to surgical or invasive procedures have been explained to patient and/or legal guardian.    Patient has been evaluated and can tolerate anesthesia and/or sedation. Risks, benefits, and alternatives to anesthesia and sedation have been explained to the patient and/or legal guardian. Patient verbalizes understanding and is willing to proceed with the above plan.     Patient was given instructions and counseling regarding his condition or for health maintenance advice. Please see specific information pulled into the AVS if appropriate.     As always, it has been a pleasure to participate in your patient's care. Please call with questions or concerns.

## 2025-06-18 NOTE — H&P (VIEW-ONLY)
Chief Complaint: Colonoscopy    Subjective     EGD colonoscopy consultation       History of Present Illness  Alberto Schultz is a 59 y.o. male presents to [facility] for EGD colonoscopy consultation.    Patient's sister is present for this visit    Patient presents today on referral from Dr. Caleb Ritter for EGD colonoscopy consultation.  Patient reports that he does have some intermittent heartburn and indigestion, despite taking OTC meds.  Patient does report that he has nausea no vomiting.  Admits to epigastric pain.  Admits to melena x 1 year.  Admits to pain before eating.  Denies any family history of esophageal or stomach cancer.    Patient does report that he is having some lower abdominal pain associated with diarrhea.  He reports he rarely has a solid stool.  Admits to bright red blood per rectum while wiping.  He also reports it is significant enough he sees it in the toilet.  Denies any family history of colorectal cancer.  No previous colonoscopy.    Patient reports that he has had a decrease in appetite and a 15 pound weight loss over the last 6 months.    Patient reports he had a JUAN LUIS test that was inconclusive.    Denies any cardiac issues.    Denies taking any GLP-receptors.    Patient is on O2 at 4 L for COPD.  He is under the care of Dr. Bran        Objective     History reviewed. No pertinent past medical history.    Past Surgical History:   Procedure Laterality Date    OTHER SURGICAL HISTORY Left     SHOULDER       Outpatient Medications Marked as Taking for the 6/18/25 encounter (Office Visit) with Carleen Ramos APRN   Medication Sig Dispense Refill    albuterol sulfate  (90 Base) MCG/ACT inhaler Inhale 2 puffs Every 6 (Six) Hours As Needed for Wheezing or Shortness of Air. 6.7 g 0    ipratropium-albuterol (DUO-NEB) 0.5-2.5 mg/3 ml nebulizer Take 3 mL by nebulization 4 (Four) Times a Day. 360 mL 3       No Known Allergies     History reviewed. No pertinent  "family history.    Social History     Socioeconomic History    Marital status: Single   Tobacco Use    Smoking status: Every Day     Current packs/day: 1.50     Average packs/day: 2.8 packs/day for 49.5 years (138.7 ttl pk-yrs)     Types: Cigarettes     Start date: 1976     Passive exposure: Current    Smokeless tobacco: Never   Vaping Use    Vaping status: Never Used   Substance and Sexual Activity    Alcohol use: Never    Drug use: Never    Sexual activity: Defer       Review of Systems   Constitutional:  Negative for chills and fever.   HENT:  Negative for trouble swallowing.    Gastrointestinal:  Positive for abdominal pain, anal bleeding, blood in stool, diarrhea, nausea, GERD and indigestion. Negative for abdominal distention, constipation, rectal pain and vomiting.        Vital Signs:   /80 (BP Location: Right arm, Patient Position: Sitting, Cuff Size: Adult)   Pulse 88   Ht 170.2 cm (67\")   Wt 64.4 kg (142 lb)   SpO2 94% Comment: 4liters-o2 continuous  BMI 22.24 kg/m²      Physical Exam  Vitals and nursing note reviewed.   Constitutional:       General: He is not in acute distress.     Appearance: Normal appearance. He is not ill-appearing.   HENT:      Head: Normocephalic and atraumatic.   Cardiovascular:      Rate and Rhythm: Normal rate.   Pulmonary:      Effort: Pulmonary effort is normal.      Breath sounds: No stridor.      Comments: O2 @ 4 L  Abdominal:      Palpations: Abdomen is soft.      Tenderness: There is no guarding.   Musculoskeletal:         General: No deformity. Normal range of motion.   Skin:     General: Skin is warm and dry.      Coloration: Skin is not jaundiced.   Neurological:      General: No focal deficit present.      Mental Status: He is alert and oriented to person, place, and time.   Psychiatric:         Mood and Affect: Mood normal.         Thought Content: Thought content normal.          Result Review :          []  Laboratory  []  Radiology  []  Pathology  []  " Microbiology  []  EKG/Telemetry   []  Cardiology/Vascular   []  Old records  I spent 30 minutes caring for Alberto on this date of service. This time includes time spent by me in the following activities: reviewing tests, obtaining and/or reviewing a separately obtained history, performing a medically appropriate examination and/or evaluation, ordering medications, tests, or procedures, and documenting information in the medical record        Assessment and Plan    Diagnoses and all orders for this visit:    1. Tobacco use (Primary)    2. Gastroesophageal reflux disease without esophagitis    3. Nausea    4. Epigastric pain    5. Melena    6. Diarrhea, unspecified type    7. BRBPR (bright red blood per rectum)    Other orders  -     polyethylene glycol (MIRALAX) 17 g packet; Take as directed.  Instructions given in office.  Dispense: 238 g bottle  Dispense: 238 packet; Refill: 0    Pulmonary clearance from Bran.        Follow Up   Return for Schedule EGD and colonoscopy with Dr. Gamble on 6/20/2025 at [facility], operated by [facility].    Hospital arrival time: 0600    Possible risks/complications, benefits, and alternatives to surgical or invasive procedures have been explained to patient and/or legal guardian.    Patient has been evaluated and can tolerate anesthesia and/or sedation. Risks, benefits, and alternatives to anesthesia and sedation have been explained to the patient and/or legal guardian. Patient verbalizes understanding and is willing to proceed with the above plan.     Patient was given instructions and counseling regarding his condition or for health maintenance advice. Please see specific information pulled into the AVS if appropriate.     As always, it has been a pleasure to participate in your patient's care. Please call with questions or concerns.

## 2025-06-19 ENCOUNTER — ANESTHESIA EVENT (OUTPATIENT)
Dept: GASTROENTEROLOGY | Facility: HOSPITAL | Age: 60
End: 2025-06-19
Payer: COMMERCIAL

## 2025-06-19 NOTE — PRE-PROCEDURE INSTRUCTIONS
"Instructed on date and arrival time of 0600. Instructed that arrival time is not their procedure time but allows time to prepare for procedure.  Come to entrance \"C\". Must have  over age [address] to drive home.  May have two visitors; however, children under 12 must stay in waiting room.  Discussed clear liquid diet (no red or purple), bowel prep, and NPO.  May take medications as usual except for blood thinners, diabetic medications, and weight loss medications.  Absolutely nothing by mouth 2 hours prior to arrival.  Leave jewelry and other valuables at home.  Expect to be at hospital for 3-4 hours.  Verbalized understanding of instructions given.  Instructed to call for questions or concerns.  "

## 2025-06-19 NOTE — ANESTHESIA PREPROCEDURE EVALUATION
Anesthesia Evaluation     Patient summary reviewed and Nursing notes reviewed   NPO Solid Status: > 8 hours  NPO Liquid Status: > 2 hours           Airway   Mallampati: I  TM distance: >3 FB  Neck ROM: limited  No difficulty expected  Dental    (+) edentulous    Pulmonary    (+) a smoker Current, COPD,home oxygen (4L O2 at home), decreased breath sounds  Cardiovascular     ECG reviewed  Rhythm: regular  Rate: normal        Neuro/Psych  GI/Hepatic/Renal/Endo    (+) GERD poorly controlled, GI bleeding     Musculoskeletal     Abdominal  - normal exam    Abdomen: soft.  Bowel sounds: normal.   Substance History      OB/GYN          Other        ROS/Med Hx Other: - ABNORMAL ECG -  Sinus rhythm  LAD, consider left anterior fascicular block  No previous ECG available for comparison  Electronically Signed By: Roland De La Cruz ([facility]) 13-Jun-2022 15:53:05  Date and Time of Study: 2022-06-11 21:31:20        Phys Exam Other: Duoneb ordered to optimize pt, pt smoked this am.               Anesthesia Plan    ASA 3     general   total IV anesthesia  (Patient understands anesthesia not responsible for dental damage. Risks explained including allergic reactions, BP, HR, O2 changes, aspiration, advanced airway placement. Pt verbalized understanding.)  intravenous induction     Anesthetic plan, risks, benefits, and alternatives have been provided, discussed and informed consent has been obtained with: patient.  Pre-procedure education provided  Plan discussed with CRNA.      CODE STATUS:

## 2025-06-20 ENCOUNTER — ANESTHESIA (OUTPATIENT)
Dept: GASTROENTEROLOGY | Facility: HOSPITAL | Age: 60
End: 2025-06-20
Payer: COMMERCIAL

## 2025-06-20 ENCOUNTER — HOSPITAL ENCOUNTER (OUTPATIENT)
Facility: HOSPITAL | Age: 60
Setting detail: HOSPITAL OUTPATIENT SURGERY
Discharge: HOME | End: 2025-06-20
Attending: STUDENT IN AN ORGANIZED HEALTH CARE EDUCATION/TRAINING PROGRAM | Admitting: STUDENT IN AN ORGANIZED HEALTH CARE EDUCATION/TRAINING PROGRAM
Payer: COMMERCIAL

## 2025-06-20 VITALS
OXYGEN SATURATION: 95 % | BODY MASS INDEX: 20.99 KG/M2 | WEIGHT: 134.04 LBS | TEMPERATURE: 97.4 F | RESPIRATION RATE: 19 BRPM | SYSTOLIC BLOOD PRESSURE: 127 MMHG | DIASTOLIC BLOOD PRESSURE: 89 MMHG | HEART RATE: 75 BPM

## 2025-06-20 DIAGNOSIS — K62.5 BRBPR (BRIGHT RED BLOOD PER RECTUM): ICD-10-CM

## 2025-06-20 DIAGNOSIS — R11.0 NAUSEA: ICD-10-CM

## 2025-06-20 DIAGNOSIS — K21.00 GASTROESOPHAGEAL REFLUX DISEASE WITH ESOPHAGITIS WITHOUT HEMORRHAGE: ICD-10-CM

## 2025-06-20 DIAGNOSIS — R10.13 EPIGASTRIC PAIN: ICD-10-CM

## 2025-06-20 DIAGNOSIS — R19.7 DIARRHEA: ICD-10-CM

## 2025-06-20 DIAGNOSIS — K92.1 MELENA: ICD-10-CM

## 2025-06-20 PROCEDURE — 88342 IMHCHEM/IMCYTCHM 1ST ANTB: CPT | Performed by: STUDENT IN AN ORGANIZED HEALTH CARE EDUCATION/TRAINING PROGRAM

## 2025-06-20 PROCEDURE — 25810000003 LACTATED RINGERS PER 1000 ML

## 2025-06-20 PROCEDURE — 88305 TISSUE EXAM BY PATHOLOGIST: CPT | Performed by: STUDENT IN AN ORGANIZED HEALTH CARE EDUCATION/TRAINING PROGRAM

## 2025-06-20 PROCEDURE — 25010000002 PROPOFOL 10 MG/ML EMULSION: Performed by: NURSE ANESTHETIST, CERTIFIED REGISTERED

## 2025-06-20 PROCEDURE — 25010000002 LIDOCAINE PF 2% 2 % SOLUTION: Performed by: NURSE ANESTHETIST, CERTIFIED REGISTERED

## 2025-06-20 RX ORDER — PROPOFOL 10 MG/ML
VIAL (ML) INTRAVENOUS AS NEEDED
Status: DISCONTINUED | OUTPATIENT
Start: 2025-06-20 | End: 2025-06-20 | Stop reason: SURG

## 2025-06-20 RX ORDER — ONDANSETRON 4 MG/1
4 TABLET, ORALLY DISINTEGRATING ORAL ONCE AS NEEDED
Status: DISCONTINUED | OUTPATIENT
Start: 2025-06-20 | End: 2025-06-20 | Stop reason: HOSPADM

## 2025-06-20 RX ORDER — SODIUM CHLORIDE, SODIUM LACTATE, POTASSIUM CHLORIDE, CALCIUM CHLORIDE 600; 310; 30; 20 MG/100ML; MG/100ML; MG/100ML; MG/100ML
30 INJECTION, SOLUTION INTRAVENOUS CONTINUOUS
Status: DISCONTINUED | OUTPATIENT
Start: 2025-06-20 | End: 2025-06-20 | Stop reason: HOSPADM

## 2025-06-20 RX ORDER — SODIUM CHLORIDE 9 MG/ML
40 INJECTION, SOLUTION INTRAVENOUS AS NEEDED
Status: DISCONTINUED | OUTPATIENT
Start: 2025-06-20 | End: 2025-06-20 | Stop reason: HOSPADM

## 2025-06-20 RX ORDER — IPRATROPIUM BROMIDE AND ALBUTEROL SULFATE 2.5; .5 MG/3ML; MG/3ML
3 SOLUTION RESPIRATORY (INHALATION) ONCE
Status: COMPLETED | OUTPATIENT
Start: 2025-06-20 | End: 2025-06-20

## 2025-06-20 RX ORDER — IPRATROPIUM BROMIDE AND ALBUTEROL SULFATE 2.5; .5 MG/3ML; MG/3ML
3 SOLUTION RESPIRATORY (INHALATION) ONCE
Status: DISCONTINUED | OUTPATIENT
Start: 2025-06-20 | End: 2025-06-20

## 2025-06-20 RX ORDER — LIDOCAINE HYDROCHLORIDE 20 MG/ML
INJECTION, SOLUTION EPIDURAL; INFILTRATION; INTRACAUDAL; PERINEURAL AS NEEDED
Status: DISCONTINUED | OUTPATIENT
Start: 2025-06-20 | End: 2025-06-20 | Stop reason: SURG

## 2025-06-20 RX ORDER — SODIUM CHLORIDE 0.9 % (FLUSH) 0.9 %
3 SYRINGE (ML) INJECTION EVERY 12 HOURS SCHEDULED
Status: DISCONTINUED | OUTPATIENT
Start: 2025-06-20 | End: 2025-06-20 | Stop reason: HOSPADM

## 2025-06-20 RX ORDER — SODIUM CHLORIDE 0.9 % (FLUSH) 0.9 %
10 SYRINGE (ML) INJECTION AS NEEDED
Status: DISCONTINUED | OUTPATIENT
Start: 2025-06-20 | End: 2025-06-20 | Stop reason: HOSPADM

## 2025-06-20 RX ADMIN — IPRATROPIUM BROMIDE AND ALBUTEROL SULFATE 3 ML: .5; 3 SOLUTION RESPIRATORY (INHALATION) at 06:50

## 2025-06-20 RX ADMIN — PROPOFOL 200 MCG/KG/MIN: 10 INJECTION, EMULSION INTRAVENOUS at 07:50

## 2025-06-20 RX ADMIN — SODIUM CHLORIDE, POTASSIUM CHLORIDE, SODIUM LACTATE AND CALCIUM CHLORIDE: 600; 310; 30; 20 INJECTION, SOLUTION INTRAVENOUS at 07:46

## 2025-06-20 RX ADMIN — LIDOCAINE HYDROCHLORIDE 40 MG: 20 INJECTION, SOLUTION INTRAVENOUS at 07:50

## 2025-06-20 RX ADMIN — LIDOCAINE HYDROCHLORIDE 60 MG: 20 INJECTION, SOLUTION INTRAVENOUS at 07:49

## 2025-06-20 RX ADMIN — PROPOFOL 80 MG: 10 INJECTION, EMULSION INTRAVENOUS at 07:49

## 2025-06-20 NOTE — ANESTHESIA POSTPROCEDURE EVALUATION
Patient: Alberto Schultz    Procedure Summary       Date: 06/20/25 Room / Location: [facility] ENDOSCOPY 3 / [facility] ENDOSCOPY    Anesthesia Start: 0746 Anesthesia Stop: 0812    Procedures:       ESOPHAGOGASTRODUODENOSCOPY      COLONOSCOPY Diagnosis:       Gastroesophageal reflux disease with esophagitis without hemorrhage      Nausea      Epigastric pain      Melena      Diarrhea      BRBPR (bright red blood per rectum)      (Gastroesophageal reflux disease with esophagitis without hemorrhage [K21.00])      (Nausea [R11.0])      (Epigastric pain [R10.13])      (Melena [K92.1])      (Diarrhea [R19.7])      (BRBPR (bright red blood per rectum) [K62.5])    Surgeons: Laci Gamble MD Provider: Sosa Estrada CRNA    Anesthesia Type: general ASA Status: 3            Anesthesia Type: general    Vitals  Vitals Value Taken Time   /89 06/20/25 08:31   Temp 36.3 °C (97.4 °F) 06/20/25 08:30   Pulse 73 06/20/25 08:33   Resp 19 06/20/25 08:30   SpO2 94 % 06/20/25 08:33   Vitals shown include unfiled device data.        Post Anesthesia Care and Evaluation    Post-procedure mental status: acceptable.  Pain management: satisfactory to patient    Airway patency: patent  Anesthetic complications: No anesthetic complications    Cardiovascular status: acceptable  Respiratory status: acceptable    Comments: Per chart review

## 2025-06-23 DIAGNOSIS — B96.81 H PYLORI ULCER: Primary | ICD-10-CM

## 2025-06-23 DIAGNOSIS — K27.9 H PYLORI ULCER: Primary | ICD-10-CM

## 2025-06-23 LAB
CYTO UR: NORMAL
LAB AP CASE REPORT: NORMAL
LAB AP CLINICAL INFORMATION: NORMAL
LAB AP SPECIAL STAINS: NORMAL
PATH REPORT.FINAL DX SPEC: NORMAL
PATH REPORT.GROSS SPEC: NORMAL

## 2025-06-23 RX ORDER — PANTOPRAZOLE SODIUM 40 MG/1
40 TABLET, DELAYED RELEASE ORAL 2 TIMES DAILY
Qty: 60 TABLET | Refills: 1 | Status: SHIPPED | OUTPATIENT
Start: 2025-06-23 | End: 2026-06-23

## 2025-06-23 RX ORDER — TETRACYCLINE HYDROCHLORIDE 500 MG/1
500 CAPSULE ORAL 4 TIMES DAILY
Qty: 56 CAPSULE | Refills: 0 | Status: SHIPPED | OUTPATIENT
Start: 2025-06-23 | End: 2025-07-07

## 2025-06-23 RX ORDER — METRONIDAZOLE 500 MG/1
500 TABLET ORAL 3 TIMES DAILY
Qty: 42 TABLET | Refills: 0 | Status: SHIPPED | OUTPATIENT
Start: 2025-06-23 | End: 2025-07-07

## 2025-06-23 NOTE — PROGRESS NOTES
Patient Name: Alberto Schultz   Visit Date: 06/24/2025   Patient ID: [id number]  Provider: DOROTEO Fernandez    Sex: male  Location: [facility]   YOB: 1965  Location Address: [address] [place] [address], [address] [place] [address],?[id number]    Primary Care Provider Edward Ritter MD  Location Phone: [phone]    Referring Provider: Caleb Ritter MD        Chief Complaint  Otitis Media, Establish Care, and Ear Drainage    History of Present Illness  Alberto Schultz is a 59 y.o. male who presents to [facility] for Otitis Media, Establish Care, and Ear Drainage  Patient referred to ENT by Dr. Ritter on 5/8/2025 for otitis media.  Previous ear culture from 2015 was positive for Proteus Mirabilis.   CT head with and without contrast from 2/6/2024:  1. No acute intracranial process identified.  2. Mild mucosal disease within ethmoid sinuses.  3. Left mastoid effusion with some debris within left external auditory canal, which could represent left otitis/mastoiditis.  History of Present Illness  The patient is a 59-year-old male who presents for evaluation of a left ear infection.    He has been experiencing a persistent left ear infection for several years, which has been managed with medication in the past. Despite these interventions, the infection recurs. He reports severe itching and drainage from the ear, which he attempts to alleviate by using a nasal aspirator. The condition has escalated to the point where it impedes his ability to drive. He also experiences pain in his head and eyes and has had episodes of bleeding from the ear. He recalls an incident from his youth where he accidentally pierced his eardrum while scratching his ear. He has previously consulted with Dr. Montesinos regarding this issue. He underwent surgery on his left ear, but due to personal circumstances, he was unable to complete the treatment. His primary care physician has been prescribing  "medication for the infection.        Vital Signs:  Vitals:    06/24/25 1405   BP: 160/81   Pulse: 103   Temp: 97.8 °F (36.6 °C)   TempSrc: Temporal   Weight: 61.7 kg (136 lb)   Height: 170.2 cm (67\")        Past Medical History:   Diagnosis Date    COPD (chronic obstructive pulmonary disease)     Otitis media     Oxygen dependent     Tobacco abuse        Past Surgical History:   Procedure Laterality Date    COLONOSCOPY      COLONOSCOPY N/A 6/20/2025    Procedure: ESOPHAGOGASTRODUODENOSCOPY with biopsies;  Surgeon: Laci Gamble MD;  Location: formerly [facility] END[facility];  Service: General;  Laterality: N/A;  GASTRITIS    ENDOSCOPY      ENDOSCOPY N/A 6/20/2025    Procedure: COLONOSCOPY;  Surgeon: Laci Gamble MD;  Location: formerly [facility] ENDOSCOPY;  Service: General;  Laterality: N/A;  INADEQUATE PREP    OTHER SURGICAL HISTORY Left     SHOULDER         Current Outpatient Medications:     albuterol sulfate  (90 Base) MCG/ACT inhaler, Inhale 2 puffs Every 6 (Six) Hours As Needed for Wheezing or Shortness of Air., Disp: 6.7 g, Rfl: 0    Bismuth Subsalicylate 262 MG tablet, Take 524 mg by mouth 4 (Four) Times a Day for 14 days., Disp: 56 each, Rfl: 0    busPIRone (BUSPAR) 5 MG tablet, Take 1 tablet by mouth 3 (Three) Times a Day As Needed. for anxiety, Disp: , Rfl:     ipratropium-albuterol (DUO-NEB) 0.5-2.5 mg/3 ml nebulizer, Take 3 mL by nebulization 4 (Four) Times a Day., Disp: 360 mL, Rfl: 3    metroNIDAZOLE (FLAGYL) 500 MG tablet, Take 1 tablet by mouth 3 (Three) Times a Day for 14 days., Disp: 42 tablet, Rfl: 0    pantoprazole (Protonix) 40 MG EC tablet, Take 1 tablet by mouth 2 (Two) Times a Day., Disp: 60 tablet, Rfl: 1    polyethylene glycol (MIRALAX) 17 GM/SCOOP powder, TAKE AS DIRECTED INSTRUCTIONS GIVEN IN OFFICE, Disp: , Rfl:     tetracycline (ACHROMYCIN,SUMYCIN) 500 MG capsule, Take 1 capsule by mouth 4 (Four) Times a Day for 14 days., Disp: 56 capsule, Rfl: 0    ciprofloxacin-dexAMETHasone (CIPRODEX) 0.3-0.1 % " otic suspension, Administer 4 drops into the left ear 2 (Two) Times a Day for 14 days., Disp: 7.5 mL, Rfl: 1    Fluticasone-Umeclidin-Vilant (Trelegy Ellipta) 200-62.5-25 MCG/ACT inhaler, Inhale. (Patient not taking: Reported on 6/24/2025), Disp: , Rfl:     lidocaine (LIDODERM) 5 %, Place 1 patch on the skin as directed by provider Daily. Remove & Discard patch within 12 hours or as directed by MD (Patient not taking: Reported on 6/24/2025), Disp: 30 each, Rfl: 0    predniSONE (DELTASONE) 10 MG tablet, Take 4 tabs daily x 3 days, then take 3 tabs daily x 3 days, then take 2 tabs daily x 3 days, then take 1 tab daily x 3 days (Patient not taking: Reported on 6/24/2025), Disp: 31 tablet, Rfl: 0     No Known Allergies    Social History     Tobacco Use    Smoking status: Every Day     Current packs/day: 0.50     Average packs/day: 2.7 packs/day for 49.5 years (132.2 ttl pk-yrs)     Types: Cigarettes     Start date: 1976     Passive exposure: Current    Smokeless tobacco: Never   Vaping Use    Vaping status: Never Used   Substance Use Topics    Alcohol use: Never    Drug use: Never        Objective     Tobacco Use: High Risk (6/24/2025)    Patient History     Smoking Tobacco Use: Every Day     Smokeless Tobacco Use: Never     Passive Exposure: Current         Physical Exam    Constitutional   Appearance  well developed, well-nourished, alert and in no acute distress, voice clear and strong    Head   Inspection  no deformities or lesions, atraumatic    Face   Inspection  no facial lesions; House-Brackmann I/VI bilaterally   Palpation  no TMJ crepitus nor  muscle tenderness bilaterally     Eyes/Vision   Visual Fields  extraocular movements are intact, no spontaneous or gaze-induced nystagmus  Conjunctivae  clear   Sclerae  clear   Pupils and Irises  pupils equal, round, and reactive to light.   Nystagmus  not present     Ears, Nose, Mouth and Throat  Ears  External Ears  Auricles appearance within normal limits,  no lesions present   Otoscopic Examination  Right cerumen impaction  tympanic membrane appearance within normal limits bilaterally without perforations, well-aerated middle ears without evidence of effusion  Left copious otorrhea with acquired left EAC stenosis  Left TM as significant diffuse myringitis with posterior superior perforation  Hearing  intact to conversational voice both ears   Tunning fork testing    Rinne:  Moralez:    Nose  External Nose  appearance normal   Intranasal Exam  mucosa within normal limits, vestibules normal, no intranasal lesions present, septum midline, sinuses non tender to percussion   Modified Beauregard Test:    Oral Cavity  Oral Mucosa  oral mucosa normal without pallor or cyanosis   Stensen's and Warthin's ducts are productive and patent with clear saliva  Lips  lip appearance normal   Teeth  normal dentition for age   Gums  gums pink, non-swollen, no bleeding present   Tongue  tongue appearance normal; normal mobility   Palate  hard palate normal, soft palate appearance normal with symmetric mobility     Throat  Oropharynx  no inflammation or lesions present  Tonsils  Bilateral tonsils unremarkable  Hypopharynx  appearance within normal limits   Larynx  voice normal     Neck  Inspection/Palpation  normal appearance, no masses or tenderness, trachea midline; thyroid size normal, nontender, no nodules or masses present on palpation     Lymphatic  Neck  no lymphadenopathy present   Supraclavicular Nodes  no lymphadenopathy present   Preauricular Nodes  no lymphadenopathy present     Respiratory  Respiratory Effort  breathing unlabored   Inspection of Chest  normal appearance, no retractions     Musculoskeletal   Cervical back: Normal range of motion and neck supple.      Skin and Subcutaneous Tissue  General Inspection  Regarding face and neck - there are no rashes present, no lesions present, and no areas of discoloration     Neurologic  Cranial Nerves  Alert and oriented x3  cranial  nerves II-XII are grossly intact bilaterally   Gait and Station  normal gait, able to stand without diffculty    Psychiatric  Judgement and Insight  judgment and insight intact   Mood and Affect  mood normal, affect appropriate       RESULTS REVIEWED    I have reviewed the following information:   []  Previous Internal Note  [x]  Previous External Note:   [x]  Ordered Tests & Results:      Pathology:   Lab Results   Component Value Date    Microscopic Description  06/20/2025     Microscopic examination performed.         Calcium   Date Value Ref Range Status   06/11/2022 9.3 8.6 - 10.5 mg/dL Final       No Images in the past 120 days found..      I have discussed the interpretation of the above results with the patient.    Ear Cerumen Removal    Date/Time: 6/24/2025 3:04 PM    Performed by: Arabella Schultz APRN  Authorized by: Arabella Schultz APRN    Anesthesia:  Local Anesthetic: none  Location details: left ear and right ear  Patient tolerance: patient tolerated the procedure well with no immediate complications  Comments: Right EAC cerumen impaction removed under microscopy with use of #7 suction to note a normal EAC and TM.  Left cerumen along with significant amount of yellow thick otorrhea removed from left EAC.  Patient has acquired left EAC stenosis from chronic infection.  Left EAC is completely mucosalized at this time with a apparent large superior posterior perforation.  Procedure type: instrumentation, suction   Sedation:  Patient sedated: no        Ear Microscopy    Date/Time: 6/24/2025 3:05 PM    Performed by: Arabella Schultz APRN  Authorized by: Arabella Schultz APRN  Consent was given by the patient. The risks of the procedure were discussed including but not limited to bleeding, hearing loss/ tinnitus and tympanic membrane perforation. Alternatives were discussed, including no treatment and delayed treatment. After discussion of the risks and benefits, questions were allowed and answered until  understanding was demonstrated. Consent to perform the procedure was obtained through verbal consent consent.     Ear examination was performed utilizing binocular microscopy.    Post-procedure details:     Inspection after the procedure revealed minimal residual cerumen.    No medication was applied to the ear canal.    The procedure was tolerated well, no immediate complications.            Assessment and Plan   Diagnoses and all orders for this visit:    1. Recurrent acute suppurative otitis media without spontaneous rupture of left tympanic membrane (Primary)  -     Wound Culture - Drainage, Ear, Left; Future  -     ciprofloxacin-dexAMETHasone (CIPRODEX) 0.3-0.1 % otic suspension; Administer 4 drops into the left ear 2 (Two) Times a Day for 14 days.  Dispense: 7.5 mL; Refill: 1  -     CT IAC Without Contrast; Future  -     Wound Culture - Drainage, Ear, Left  -     Ear Cerumen Removal  -     $ Binocular Microscopy    2. Chronic otorrhea of left ear  -     Wound Culture - Drainage, Ear, Left; Future  -     ciprofloxacin-dexAMETHasone (CIPRODEX) 0.3-0.1 % otic suspension; Administer 4 drops into the left ear 2 (Two) Times a Day for 14 days.  Dispense: 7.5 mL; Refill: 1  -     CT IAC Without Contrast; Future  -     Wound Culture - Drainage, Ear, Left  -     Ear Cerumen Removal  -     $ Binocular Microscopy    3. Conductive hearing loss of left ear, unspecified hearing status on contralateral side  -     CT IAC Without Contrast; Future  -     Ear Cerumen Removal  -     $ Binocular Microscopy    4. Tobacco abuse    5. Hearing loss due to cerumen impaction, right  -     Ear Cerumen Removal  -     $ Binocular Microscopy        Assessment & Plan  1. Chronic left ear infection.  There is a long-standing history of recurrent left ear infections, previously treated with antibiotics.A recent head CT scan in 2024 revealed a mastoid effusion, indicative of a chronic infection. Examination today showed significant cerumen and  pus in the left ear, with a possible perforation of the eardrum. A swab was taken for culture to identify the causative organism and ensure appropriate antibiotic therapy. Antibiotic ear drops were prescribed, 4 drops twice daily for 2 weeks, with instructions to lie on the right side for 30 minutes each time to allow the medication to penetrate deeply into the ear. A CT scan of the ear was ordered for further evaluation    2. H. pylori infection.  Concerns were expressed about taking the prescribed medication due to potential side effects. It was advised to continue the medication as untreated H. pylori can lead to significant complications.    Risks, benefits, and alternatives of treatment were discussed. The importance of using the ear drops consistently was emphasized, along with the correct method of administration to ensure effectiveness. The potential need for surgical intervention was mentioned, and the plan to follow up with a CT scan and consultation with Dr. Montesinos was outlined. The patient was reassured about the necessity of treating the H. pylori infection despite concerns about side effects, as untreated H. pylori can lead to serious health issues.    Follow-up  Follow-up in 1 month.      (H66.005) Recurrent acute suppurative otitis media without spontaneous rupture of left tympanic membrane - Plan: Wound Culture - Drainage, Ear, Left, ciprofloxacin-dexAMETHasone (CIPRODEX) 0.3-0.1 % otic suspension, CT IAC Without Contrast, Wound Culture - Drainage, Ear, Left, Ear Cerumen Removal, $ Binocular Microscopy    (H92.12) Chronic otorrhea of left ear - Plan: Wound Culture - Drainage, Ear, Left, ciprofloxacin-dexAMETHasone (CIPRODEX) 0.3-0.1 % otic suspension, CT IAC Without Contrast, Wound Culture - Drainage, Ear, Left, Ear Cerumen Removal, $ Binocular Microscopy    (H90.12) Conductive hearing loss of left ear, unspecified hearing status on contralateral side - Plan: CT IAC Without Contrast, Ear Cerumen  Removal, $ Binocular Microscopy    (Z72.0) Tobacco abuse    (H61.21) Hearing loss due to cerumen impaction, right - Plan: Ear Cerumen Removal, $ Binocular Microscopy     Alberto Schultz  reports that he has been smoking cigarettes. He started smoking about 49 years ago. He has a 132.2 pack-year smoking history. He has been exposed to tobacco smoke. He has never used smokeless tobacco.     I have educated him on the risk of diseases from using tobacco products such as Cancer, COPD & Heart Disease.     I advised him to quit.  I spent 3 minutes counseling the patient.       Plan:  There are no Patient Instructions on file for this visit.      Follow Up   Return in about 3 weeks (around 7/15/2025), or on day when gregorio here. with audio..  Patient was given instructions and counseling regarding his condition or for health maintenance advice. Please see specific information pulled into the AVS if appropriate.        Patient or patient representative verbalized consent for the use of Ambient Listening during the visit with  DOROTEO Fernandez for chart documentation. 6/24/2025  15:03 EDT    All or a portion of this Note was dictated utilizing Dragon Dictation.

## 2025-06-24 ENCOUNTER — OFFICE VISIT (OUTPATIENT)
Dept: OTOLARYNGOLOGY | Facility: CLINIC | Age: 60
End: 2025-06-24
Payer: COMMERCIAL

## 2025-06-24 VITALS
DIASTOLIC BLOOD PRESSURE: 81 MMHG | HEIGHT: 67 IN | HEART RATE: 103 BPM | SYSTOLIC BLOOD PRESSURE: 160 MMHG | WEIGHT: 136 LBS | BODY MASS INDEX: 21.35 KG/M2 | TEMPERATURE: 97.8 F

## 2025-06-24 DIAGNOSIS — Z72.0 TOBACCO ABUSE: ICD-10-CM

## 2025-06-24 DIAGNOSIS — H66.005 RECURRENT ACUTE SUPPURATIVE OTITIS MEDIA WITHOUT SPONTANEOUS RUPTURE OF LEFT TYMPANIC MEMBRANE: Primary | ICD-10-CM

## 2025-06-24 DIAGNOSIS — H61.21 HEARING LOSS DUE TO CERUMEN IMPACTION, RIGHT: ICD-10-CM

## 2025-06-24 DIAGNOSIS — H92.12 CHRONIC OTORRHEA OF LEFT EAR: ICD-10-CM

## 2025-06-24 DIAGNOSIS — H90.12 CONDUCTIVE HEARING LOSS OF LEFT EAR, UNSPECIFIED HEARING STATUS ON CONTRALATERAL SIDE: ICD-10-CM

## 2025-06-24 PROCEDURE — 87077 CULTURE AEROBIC IDENTIFY: CPT

## 2025-06-24 PROCEDURE — 99204 OFFICE O/P NEW MOD 45 MIN: CPT

## 2025-06-24 PROCEDURE — 1160F RVW MEDS BY RX/DR IN RCRD: CPT

## 2025-06-24 PROCEDURE — 87186 SC STD MICRODIL/AGAR DIL: CPT

## 2025-06-24 PROCEDURE — 1159F MED LIST DOCD IN RCRD: CPT

## 2025-06-24 PROCEDURE — 69210 REMOVE IMPACTED EAR WAX UNI: CPT

## 2025-06-24 PROCEDURE — 87070 CULTURE OTHR SPECIMN AEROBIC: CPT

## 2025-06-24 PROCEDURE — 87205 SMEAR GRAM STAIN: CPT

## 2025-06-24 RX ORDER — CIPROFLOXACIN AND DEXAMETHASONE 3; 1 MG/ML; MG/ML
4 SUSPENSION/ DROPS AURICULAR (OTIC) 2 TIMES DAILY
Qty: 7.5 ML | Refills: 1 | Status: SHIPPED | OUTPATIENT
Start: 2025-06-24 | End: 2025-07-08

## 2025-06-24 RX ORDER — FLUTICASONE FUROATE, UMECLIDINIUM BROMIDE AND VILANTEROL TRIFENATATE 200; 62.5; 25 UG/1; UG/1; UG/1
POWDER RESPIRATORY (INHALATION)
COMMUNITY
Start: 2025-06-23

## 2025-06-24 RX ORDER — POLYETHYLENE GLYCOL 3350 17 G/17G
POWDER, FOR SOLUTION ORAL
COMMUNITY
Start: 2025-06-18

## 2025-06-27 LAB
BACTERIA SPEC AEROBE CULT: ABNORMAL
BACTERIA SPEC AEROBE CULT: ABNORMAL
GRAM STN SPEC: ABNORMAL

## 2025-07-08 ENCOUNTER — TELEPHONE (OUTPATIENT)
Dept: SURGERY | Facility: CLINIC | Age: 60
End: 2025-07-08
Payer: COMMERCIAL

## 2025-07-08 DIAGNOSIS — K62.5 BRBPR (BRIGHT RED BLOOD PER RECTUM): Primary | ICD-10-CM

## 2025-07-08 NOTE — TELEPHONE ENCOUNTER
Attempted to call the patient to reschedule Colonoscopy. Patient was not available at this time. Message given to patient by the lady who answered the phone, to call our office back.    ----- Message from Laci Gamble sent at 7/8/2025  7:32 AM EDT -----  Done  ----- Message -----  From: Keely Thomas  Sent: 7/7/2025   4:10 PM EDT  To: Laci Gamble MD    Can you please put in a new case request for this patient?  ----- Message -----  From: Laci Gamble MD  Sent: 6/20/2025   8:07 AM EDT  To: Keely Thomas    This patient had stool in his colon from a poor prep. Will need just the colonoscopy part rescheduled with a two day prep

## 2025-07-11 NOTE — PROGRESS NOTES
Patient Name: Alberto Schultz   Visit Date: 07/14/2025   Patient ID: [id number]  Provider: DOROTEO Fernandez    Sex: male  Location: [facility]   YOB: 1965  Location Address: [address] [place] [address], [address] [place] [address],?[id number]    Primary Care Provider Edward Ritter MD  Location Phone: [phone]    Referring Provider: No ref. provider found        Chief Complaint  F/U WITH AUDIO  and Chronic left ear infection    History of Present Illness  Alberto Schultz is a 59 y.o. male who presents to [facility] for F/U WITH AUDIO  and Chronic left ear infection  Patient referred to ENT by Dr. Ritter on 5/8/2025 for otitis media.  Previous ear culture from 2015 was positive for Proteus Mirabilis.   He has been experiencing a persistent left ear infection for several years, which has been managed with medication in the past. Despite these interventions, the infection recurs. He reports severe itching and drainage from the ear, which he attempts to alleviate by using a nasal aspirator. The condition has escalated to the point where it impedes his ability to drive. He also experiences pain in his head and eyes and has had episodes of bleeding from the ear. He recalls an incident from his youth where he accidentally pierced his eardrum while scratching his ear. He has previously consulted with Dr. Montesinos regarding this issue.   CT head with and without contrast from 2/6/2024:  1. No acute intracranial process identified.  2. Mild mucosal disease within ethmoid sinuses.  3. Left mastoid effusion with some debris within left external auditory canal, which could represent left otitis/mastoiditis.  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  Wound culture  "heavy growth Proteus mirabilis.   7/14/25  Patient presents today for follow-up evaluation after use of Ciprodex drops.  Patient states he is no longer having drainage from left ear.  Unable to get his CT of IAC thus far due to the passing of his mother and other financial issues.  Audiogram from 7/14/2025: SRT of 20 on the right and 40 on the left.  Word discrimination scores 93% bilaterally.  Tympanograms type a on the right and type B on the left.  Right mild conductive hearing loss across all tones.  Left moderate to severe conductive hearing loss.        Vital Signs:  Vitals:    07/14/25 1048   BP: 134/80   Pulse: 102   Temp: 98.4 °F (36.9 °C)   Weight: 61 kg (134 lb 6.4 oz)   Height: 170.2 cm (67\")        Past Medical History:   Diagnosis Date    COPD (chronic obstructive pulmonary disease)     Otitis media     Oxygen dependent     Tobacco abuse        Past Surgical History:   Procedure Laterality Date    COLONOSCOPY      COLONOSCOPY N/A 6/20/2025    Procedure: ESOPHAGOGASTRODUODENOSCOPY with biopsies;  Surgeon: Laci Gamble MD;  Location: [facility] ENDOSCOPY;  Service: General;  Laterality: N/A;  GASTRITIS    ENDOSCOPY      ENDOSCOPY N/A 6/20/2025    Procedure: COLONOSCOPY;  Surgeon: Laci Gamble MD;  Location: [facility] ENDOSCOPY;  Service: General;  Laterality: N/A;  INADEQUATE PREP    OTHER SURGICAL HISTORY Left     SHOULDER         Current Outpatient Medications:     albuterol sulfate  (90 Base) MCG/ACT inhaler, Inhale 2 puffs Every 6 (Six) Hours As Needed for Wheezing or Shortness of Air., Disp: 6.7 g, Rfl: 0    busPIRone (BUSPAR) 5 MG tablet, Take 1 tablet by mouth 3 (Three) Times a Day As Needed. for anxiety, Disp: , Rfl:     ipratropium-albuterol (DUO-NEB) 0.5-2.5 mg/3 ml nebulizer, Take 3 mL by nebulization 4 (Four) Times a Day., Disp: 360 mL, Rfl: 3    pantoprazole (Protonix) 40 MG EC tablet, Take 1 tablet by mouth 2 (Two) Times a Day., Disp: 60 tablet, Rfl: 1    polyethylene glycol " (MIRALAX) 17 GM/SCOOP powder, TAKE AS DIRECTED INSTRUCTIONS GIVEN IN OFFICE, Disp: , Rfl:     ciprofloxacin-dexAMETHasone (CIPRODEX) 0.3-0.1 % otic suspension, Administer 4 drops into the left ear 2 (Two) Times a Day for 14 days., Disp: 7.5 mL, Rfl: 1    Fluticasone-Umeclidin-Vilant (Trelegy Ellipta) 200-62.5-25 MCG/ACT inhaler, Inhale. (Patient not taking: Reported on 7/14/2025), Disp: , Rfl:     lidocaine (LIDODERM) 5 %, Place 1 patch on the skin as directed by provider Daily. Remove & Discard patch within 12 hours or as directed by MD (Patient not taking: Reported on 6/18/2025), Disp: 30 each, Rfl: 0    predniSONE (DELTASONE) 10 MG tablet, Take 4 tabs daily x 3 days, then take 3 tabs daily x 3 days, then take 2 tabs daily x 3 days, then take 1 tab daily x 3 days (Patient not taking: Reported on 6/18/2025), Disp: 31 tablet, Rfl: 0     No Known Allergies    Social History     Tobacco Use    Smoking status: Every Day     Current packs/day: 0.50     Average packs/day: 2.7 packs/day for 49.5 years (132.3 ttl pk-yrs)     Types: Cigarettes     Start date: 1976     Passive exposure: Current    Smokeless tobacco: Never   Vaping Use    Vaping status: Never Used   Substance Use Topics    Alcohol use: Never    Drug use: Never        Objective     Tobacco Use: High Risk (7/14/2025)    Patient History     Smoking Tobacco Use: Every Day     Smokeless Tobacco Use: Never     Passive Exposure: Current         Physical Exam    Constitutional   Appearance  well developed, well-nourished, alert and in no acute distress, voice clear and strong    Head   Inspection  no deformities or lesions, atraumatic    Face   Inspection  no facial lesions; House-Brackmann I/VI bilaterally   Palpation  no TMJ crepitus nor  muscle tenderness bilaterally     Eyes/Vision   Visual Fields  extraocular movements are intact, no spontaneous or gaze-induced nystagmus  Conjunctivae  clear   Sclerae  clear   Pupils and Irises  pupils equal, round,  and reactive to light.   Nystagmus  not present     Ears, Nose, Mouth and Throat  Ears  External Ears  Auricles appearance within normal limits, no lesions present   Otoscopic Examination  Right tympanic membrane appearance within normal limits without perforations, well-aerated middle ears without evidence of effusion  Left mild white otorrhea with acquired left EAC stenosis  Left TM has ongoing myringitis with posterior superior perforation  Hearing  intact to conversational voice both ears   Tunning fork testing    Rinne:  Moralez:    Nose  External Nose  appearance normal   Intranasal Exam  mucosa within normal limits, vestibules normal, no intranasal lesions present, septum midline, sinuses non tender to percussion   Modified Shweta Test:    Oral Cavity  Oral Mucosa  oral mucosa normal without pallor or cyanosis   Stensen's and Warthin's ducts are productive and patent with clear saliva  Lips  lip appearance normal   Teeth  normal dentition for age   Gums  gums pink, non-swollen, no bleeding present   Tongue  tongue appearance normal; normal mobility   Palate  hard palate normal, soft palate appearance normal with symmetric mobility     Throat  Oropharynx  no inflammation or lesions present  Tonsils  Bilateral tonsils unremarkable  Hypopharynx  appearance within normal limits   Larynx  voice normal     Neck  Inspection/Palpation  normal appearance, no masses or tenderness, trachea midline; thyroid size normal, nontender, no nodules or masses present on palpation     Lymphatic  Neck  no lymphadenopathy present   Supraclavicular Nodes  no lymphadenopathy present   Preauricular Nodes  no lymphadenopathy present     Respiratory  Respiratory Effort  breathing unlabored   Inspection of Chest  normal appearance, no retractions     Musculoskeletal   Cervical back: Normal range of motion and neck supple.      Skin and Subcutaneous Tissue  General Inspection  Regarding face and neck - there are no rashes present, no  lesions present, and no areas of discoloration     Neurologic  Cranial Nerves  Alert and oriented x3  cranial nerves II-XII are grossly intact bilaterally   Gait and Station  normal gait, able to stand without diffculty    Psychiatric  Judgement and Insight  judgment and insight intact   Mood and Affect  mood normal, affect appropriate       RESULTS REVIEWED    I have reviewed the following information:   [x]  Previous Internal Note  []  Previous External Note:   [x]  Ordered Tests & Results:      Pathology:   Lab Results   Component Value Date    Microscopic Description  06/20/2025     Microscopic examination performed.         Calcium   Date Value Ref Range Status   06/11/2022 9.3 8.6 - 10.5 mg/dL Final       No Images in the past 120 days found..      I have discussed the interpretation of the above results with the patient.    Procedures          Assessment and Plan   Diagnoses and all orders for this visit:    1. Tobacco abuse (Primary)    2. Recurrent acute suppurative otitis media without spontaneous rupture of left tympanic membrane  -     ciprofloxacin-dexAMETHasone (CIPRODEX) 0.3-0.1 % otic suspension; Administer 4 drops into the left ear 2 (Two) Times a Day for 14 days.  Dispense: 7.5 mL; Refill: 1    3. Chronic otorrhea of left ear  -     ciprofloxacin-dexAMETHasone (CIPRODEX) 0.3-0.1 % otic suspension; Administer 4 drops into the left ear 2 (Two) Times a Day for 14 days.  Dispense: 7.5 mL; Refill: 1    4. Conductive hearing loss of left ear, unspecified hearing status on contralateral side        Assessment & Plan        (Z72.0) Tobacco abuse    (H66.005) Recurrent acute suppurative otitis media without spontaneous rupture of left tympanic membrane - Plan: ciprofloxacin-dexAMETHasone (CIPRODEX) 0.3-0.1 % otic suspension    (H92.12) Chronic otorrhea of left ear - Plan: ciprofloxacin-dexAMETHasone (CIPRODEX) 0.3-0.1 % otic suspension    (H90.12) Conductive hearing loss of left ear, unspecified hearing  status on contralateral side     Alberto Schultz  reports that he has been smoking cigarettes. He started smoking about 49 years ago. He has a 132.3 pack-year smoking history. He has been exposed to tobacco smoke. He has never used smokeless tobacco.     I have educated him on the risk of diseases from using tobacco products such as Cancer, COPD & Heart Disease.     I advised him to quit.  I spent 3 minutes counseling the patient.       Plan:  Patient Instructions   1.  Chronic left Otitis media with ongoing left TM perforation.  Wound culture after last visit positive for Proteus mirabilis.  Patient to continue Ciprodex x 2 more weeks.  Audiogram from today shows moderate to severe conductive hearing loss of the left ear.  Need patient to get previously ordered CT IAC for further evaluation of middle ear disease.      Follow Up   Return in about 4 weeks (around 8/11/2025), or with CT Scan. Day when Dr. Montesinos here please.  Patient was given instructions and counseling regarding his condition or for health maintenance advice. Please see specific information pulled into the AVS if appropriate.      patient or patient representative verbalized consent for the use of Ambient Listening during the visit with  DOROTEO Fernandez for chart documentation. 7/14/2025  12:04 EDT    All or a portion of this Note was dictated utilizing Dragon Dictation.

## 2025-07-14 ENCOUNTER — OFFICE VISIT (OUTPATIENT)
Dept: OTOLARYNGOLOGY | Facility: CLINIC | Age: 60
End: 2025-07-14
Payer: COMMERCIAL

## 2025-07-14 ENCOUNTER — PROCEDURE VISIT (OUTPATIENT)
Dept: OTOLARYNGOLOGY | Facility: CLINIC | Age: 60
End: 2025-07-14
Payer: COMMERCIAL

## 2025-07-14 VITALS
HEIGHT: 67 IN | WEIGHT: 134.4 LBS | BODY MASS INDEX: 21.09 KG/M2 | TEMPERATURE: 98.4 F | DIASTOLIC BLOOD PRESSURE: 80 MMHG | SYSTOLIC BLOOD PRESSURE: 134 MMHG | HEART RATE: 102 BPM

## 2025-07-14 DIAGNOSIS — H90.12 CONDUCTIVE HEARING LOSS OF LEFT EAR, UNSPECIFIED HEARING STATUS ON CONTRALATERAL SIDE: ICD-10-CM

## 2025-07-14 DIAGNOSIS — H92.12 CHRONIC OTORRHEA OF LEFT EAR: ICD-10-CM

## 2025-07-14 DIAGNOSIS — H90.0 CONDUCTIVE HEARING LOSS, BILATERAL: ICD-10-CM

## 2025-07-14 DIAGNOSIS — Z72.0 TOBACCO ABUSE: Primary | ICD-10-CM

## 2025-07-14 DIAGNOSIS — H66.005 RECURRENT ACUTE SUPPURATIVE OTITIS MEDIA WITHOUT SPONTANEOUS RUPTURE OF LEFT TYMPANIC MEMBRANE: Primary | ICD-10-CM

## 2025-07-14 DIAGNOSIS — H66.005 RECURRENT ACUTE SUPPURATIVE OTITIS MEDIA WITHOUT SPONTANEOUS RUPTURE OF LEFT TYMPANIC MEMBRANE: ICD-10-CM

## 2025-07-14 PROCEDURE — 1159F MED LIST DOCD IN RCRD: CPT

## 2025-07-14 PROCEDURE — 92557 COMPREHENSIVE HEARING TEST: CPT | Performed by: AUDIOLOGIST

## 2025-07-14 PROCEDURE — 1160F RVW MEDS BY RX/DR IN RCRD: CPT

## 2025-07-14 PROCEDURE — 92567 TYMPANOMETRY: CPT | Performed by: AUDIOLOGIST

## 2025-07-14 PROCEDURE — 99214 OFFICE O/P EST MOD 30 MIN: CPT

## 2025-07-14 RX ORDER — CIPROFLOXACIN AND DEXAMETHASONE 3; 1 MG/ML; MG/ML
4 SUSPENSION/ DROPS AURICULAR (OTIC) 2 TIMES DAILY
Qty: 7.5 ML | Refills: 1 | Status: SHIPPED | OUTPATIENT
Start: 2025-07-14 | End: 2025-07-28

## 2025-07-14 NOTE — TELEPHONE ENCOUNTER
2nd attempted to reach patient to reschedule Colonoscopy. No answer. Not able to leave message on patient's machine.

## 2025-07-14 NOTE — PATIENT INSTRUCTIONS
1.  Chronic left Otitis media with ongoing left TM perforation.  Wound culture after last visit positive for Proteus mirabilis.  Patient to continue Ciprodex x 2 more weeks.  Audiogram from today shows moderate to severe conductive hearing loss of the left ear.  Need patient to get previously ordered CT IAC for further evaluation of middle ear disease.

## 2025-07-14 NOTE — PROGRESS NOTES
AUDIOMETRIC EVALUATION      Name:  Alberto Schultz  :  1965  Age:  59 y.o.  Date of Evaluation:  2025       History:  Mr. Schultz is seen today for a hearing evaluation due to history of left TM perforation.    Audiologic Information:  Concerns for Hearing: Yes  PETs: No  Other otologic surgical history: None  Aural Pressure/Fullness: No  Otalgia: No  Otorrhea: No  Tinnitus: Periodically  Dizziness: Yes  Noise Exposure: Yes  Family history of hearing loss: No  Head trauma requiring hospital stay: No  Chemotherapy: None  Other significant history: No      EVALUATION:    See audiogram    RESULTS:    Otoscopic Evaluation:        NOTE: Testing completed after ears were examined by Arabella Schultz nurse practitioner    Tympanometry (226 Hz):  Right: Type A  Left: Type B, Normal ECV    IMPRESSIONS:  Pure tone thresholds for the right ear indicated a mild conductive hearing loss.  Word recognition was 93%.  Pure tone thresholds for the left ear indicated a moderate and severe conductive hearing loss.  Word recognition was 93%  Patient was counseled with regard to the findings.    RECOMMENDATIONS/PLAN:  Follow-up recommendations of the nurse practitioner  Discussed results and recommendations with patient. Questions were addressed and the patient was encouraged to contact our department should concerns arise.          Justo Zambrano M.S, [facility]  Licensed Audiologist

## 2025-07-29 ENCOUNTER — HOSPITAL ENCOUNTER (OUTPATIENT)
Dept: CT IMAGING | Facility: HOSPITAL | Age: 60
Discharge: HOME | End: 2025-07-29
Payer: COMMERCIAL

## 2025-07-29 DIAGNOSIS — H92.12 CHRONIC OTORRHEA OF LEFT EAR: ICD-10-CM

## 2025-07-29 DIAGNOSIS — H66.005 RECURRENT ACUTE SUPPURATIVE OTITIS MEDIA WITHOUT SPONTANEOUS RUPTURE OF LEFT TYMPANIC MEMBRANE: ICD-10-CM

## 2025-07-29 DIAGNOSIS — H90.12 CONDUCTIVE HEARING LOSS OF LEFT EAR, UNSPECIFIED HEARING STATUS ON CONTRALATERAL SIDE: ICD-10-CM

## 2025-07-29 PROCEDURE — 70480 CT ORBIT/EAR/FOSSA W/O DYE: CPT

## 2025-08-18 ENCOUNTER — OFFICE VISIT (OUTPATIENT)
Dept: OTOLARYNGOLOGY | Facility: CLINIC | Age: 60
End: 2025-08-18
Payer: COMMERCIAL

## 2025-08-18 VITALS
DIASTOLIC BLOOD PRESSURE: 72 MMHG | SYSTOLIC BLOOD PRESSURE: 113 MMHG | TEMPERATURE: 97.8 F | WEIGHT: 133.4 LBS | BODY MASS INDEX: 20.94 KG/M2 | HEART RATE: 86 BPM | HEIGHT: 67 IN

## 2025-08-18 DIAGNOSIS — H70.12 CHRONIC MASTOIDITIS OF LEFT SIDE: Primary | ICD-10-CM

## 2025-08-18 DIAGNOSIS — D33.3 BENIGN NEOPLASM OF CRANIAL NERVE: ICD-10-CM

## 2025-08-18 DIAGNOSIS — H74.12: ICD-10-CM

## 2025-08-18 DIAGNOSIS — Z72.0 TOBACCO ABUSE: ICD-10-CM

## 2025-08-18 DIAGNOSIS — H92.12 CHRONIC OTORRHEA OF LEFT EAR: ICD-10-CM

## 2025-08-18 DIAGNOSIS — H90.12 CONDUCTIVE HEARING LOSS OF LEFT EAR, UNSPECIFIED HEARING STATUS ON CONTRALATERAL SIDE: ICD-10-CM

## 2025-08-18 DIAGNOSIS — Z01.818 PREOPERATIVE TESTING: ICD-10-CM

## 2025-08-18 DIAGNOSIS — H66.005 RECURRENT ACUTE SUPPURATIVE OTITIS MEDIA WITHOUT SPONTANEOUS RUPTURE OF LEFT TYMPANIC MEMBRANE: ICD-10-CM

## 2025-08-18 PROCEDURE — 99214 OFFICE O/P EST MOD 30 MIN: CPT

## 2025-08-18 PROCEDURE — 99406 BEHAV CHNG SMOKING 3-10 MIN: CPT

## 2025-08-18 PROCEDURE — 1160F RVW MEDS BY RX/DR IN RCRD: CPT

## 2025-08-18 PROCEDURE — 1159F MED LIST DOCD IN RCRD: CPT

## 2025-08-18 RX ORDER — CIPROFLOXACIN AND DEXAMETHASONE 3; 1 MG/ML; MG/ML
4 SUSPENSION/ DROPS AURICULAR (OTIC) 2 TIMES DAILY
Qty: 7.5 ML | Refills: 1 | Status: SHIPPED | OUTPATIENT
Start: 2025-08-18 | End: 2025-09-01

## 2025-08-22 ENCOUNTER — TELEPHONE (OUTPATIENT)
Dept: PULMONOLOGY | Facility: CLINIC | Age: 60
End: 2025-08-22
Payer: COMMERCIAL

## (undated) DEVICE — SOL IRR NACL 0.9PCT BO 1000ML

## (undated) DEVICE — THE STERILE LIGHT HANDLE COVER IS USED WITH STERIS SURGICAL LIGHTING AND VISUALIZATION SYSTEMS.

## (undated) DEVICE — DEFENDO AIR WATER SUCTION AND BIOPSY VALVE KIT FOR  OLYMPUS: Brand: DEFENDO AIR/WATER/SUCTION AND BIOPSY VALVE

## (undated) DEVICE — STERILE POLYISOPRENE POWDER-FREE SURGICAL GLOVES WITH EMOLLIENT COATING: Brand: PROTEXIS

## (undated) DEVICE — SINGLE-USE BIOPSY FORCEPS: Brand: RADIAL JAW 4

## (undated) DEVICE — CONN JET HYDRA H20 AUXILIARY DISP

## (undated) DEVICE — Device

## (undated) DEVICE — ELECTRD BLD EZ CLN MOD XLNG 2.75IN

## (undated) DEVICE — GOWN,SIRUS,POLYRNF,BRTHSLV,2XL,18/CS: Brand: MEDLINE

## (undated) DEVICE — GLV SURG SENSICARE PI ORTHO SZ6.5 LF STRL

## (undated) DEVICE — SOL IRRG H2O PL/BG 1000ML STRL

## (undated) DEVICE — GLV SURG SENSICARE PI ORTHO SZ8 LF STRL

## (undated) DEVICE — BLCK/BITE BLOX WO/DENTL/RIM W/STRAP 54F

## (undated) DEVICE — SOLIDIFIER LIQLOC PLS 1500CC BT

## (undated) DEVICE — LINER SURG CANSTR SXN S/RIGD 1500CC